# Patient Record
Sex: MALE | Race: WHITE | NOT HISPANIC OR LATINO | Employment: FULL TIME | URBAN - METROPOLITAN AREA
[De-identification: names, ages, dates, MRNs, and addresses within clinical notes are randomized per-mention and may not be internally consistent; named-entity substitution may affect disease eponyms.]

---

## 2022-07-06 ENCOUNTER — TELEPHONE (OUTPATIENT)
Dept: OTHER | Facility: OTHER | Age: 57
End: 2022-07-06

## 2022-07-06 NOTE — TELEPHONE ENCOUNTER
Pt called in stating he missed his appt today with the office at 330 due to his job and would like a call back to reschedule

## 2022-11-10 ENCOUNTER — OFFICE VISIT (OUTPATIENT)
Dept: GASTROENTEROLOGY | Facility: CLINIC | Age: 57
End: 2022-11-10

## 2022-11-10 VITALS
HEART RATE: 74 BPM | HEIGHT: 70 IN | SYSTOLIC BLOOD PRESSURE: 138 MMHG | DIASTOLIC BLOOD PRESSURE: 90 MMHG | BODY MASS INDEX: 38.08 KG/M2 | OXYGEN SATURATION: 96 % | WEIGHT: 266 LBS

## 2022-11-10 DIAGNOSIS — Z12.11 ENCOUNTER FOR SCREENING COLONOSCOPY: ICD-10-CM

## 2022-11-10 RX ORDER — ATORVASTATIN CALCIUM 10 MG/1
10 TABLET, FILM COATED ORAL DAILY
COMMUNITY
Start: 2022-10-28

## 2022-11-10 RX ORDER — EMPAGLIFLOZIN 25 MG/1
25 TABLET, FILM COATED ORAL DAILY
COMMUNITY
Start: 2022-10-11

## 2022-11-10 RX ORDER — GLIMEPIRIDE 4 MG/1
4 TABLET ORAL 2 TIMES DAILY
COMMUNITY
Start: 2022-10-17

## 2022-11-10 RX ORDER — ATENOLOL 25 MG/1
25 TABLET ORAL DAILY
COMMUNITY
Start: 2022-09-21

## 2022-11-10 RX ORDER — VALSARTAN AND HYDROCHLOROTHIAZIDE 160; 12.5 MG/1; MG/1
1 TABLET, FILM COATED ORAL DAILY
COMMUNITY

## 2022-11-10 NOTE — PROGRESS NOTES
[facility] - Outpatient Consultation  Светлана Thompson 49 y o  male MRN: [id number]  Encounter: [id number]          ASSESSMENT AND PLAN:      1  Encounter for screening colonoscopy  This will be his first screening colonoscopy  Average risk      ______________________________________________________________________    HPI:  81-year-old male with a history of diabetes presents for evaluation prior to scheduling a screening colonoscopy  This will be his 1st colonoscopy  He has no gastrointestinal symptoms at present such as abdominal pain, diarrhea, constipation or rectal bleeding  He denies the family history of colorectal cancer  REVIEW OF SYSTEMS:    CONSTITUTIONAL: Denies any fever, chills, rigors, and weight loss  HEENT: No earache or tinnitus  Denies hearing loss or visual disturbances  CARDIOVASCULAR: No chest pain or palpitations  RESPIRATORY: Denies any cough, hemoptysis, shortness of breath or dyspnea on exertion  GASTROINTESTINAL: As noted in the History of Present Illness  GENITOURINARY: No problems with urination  Denies any hematuria or dysuria  NEUROLOGIC: No dizziness or vertigo, denies headaches  MUSCULOSKELETAL: Denies any muscle or joint pain  SKIN: Denies skin rashes or itching  ENDOCRINE: Denies excessive thirst  Denies intolerance to heat or cold  PSYCHOSOCIAL: Denies depression or anxiety  Denies any recent memory loss  Historical Information   No past medical history on file  No past surgical history on file  Social History   Social History     Substance and Sexual Activity   Alcohol Use Not on file     Social History     Substance and Sexual Activity   Drug Use Not on file     Social History     Tobacco Use   Smoking Status Not on file   Smokeless Tobacco Not on file     No family history on file      Meds/Allergies       Current Outpatient Medications:   •  sitaGLIPtin-metFORMIN (JANUMET)  MG per tablet    Not on File        Objective     There were no vitals taken for this visit  There is no height or weight on file to calculate BMI  PHYSICAL EXAM:      General Appearance:   Alert, cooperative, no distress   HEENT:   Normocephalic, atraumatic, anicteric      Neck:  Supple, symmetrical, trachea midline   Lungs:   Clear to auscultation bilaterally; no rales, rhonchi or wheezing; respirations unlabored    Heart[de-identified]   Regular rate and rhythm; no murmur, rub, or gallop  Abdomen:   Soft, non-tender, non-distended; normal bowel sounds; no masses, no organomegaly    Genitalia:   Deferred    Rectal:   Deferred    Extremities:  No cyanosis, clubbing or edema    Pulses:  2+ and symmetric    Skin:  No jaundice, rashes, or lesions    Lymph nodes:  No palpable cervical lymphadenopathy        Lab Results:   No visits with results within 1 Day(s) from this visit  Latest known visit with results is:   No results found for any previous visit  Radiology Results:   No results found

## 2022-11-10 NOTE — PATIENT INSTRUCTIONS
Scheduled date of colonoscopy (as of today):2/10/23  Physician performing colonoscopy:Won  Location of colonoscopy:[place]  Bowel prep reviewed with patient:Ketty/Miralax  Instructions reviewed with patient by:Lawrence bangura  Clearances:  none

## 2023-02-10 ENCOUNTER — HOSPITAL ENCOUNTER (OUTPATIENT)
Dept: GASTROENTEROLOGY | Facility: HOSPITAL | Age: 58
Setting detail: OUTPATIENT SURGERY
Discharge: HOME | End: 2023-02-10

## 2023-02-10 ENCOUNTER — ANESTHESIA EVENT (OUTPATIENT)
Dept: GASTROENTEROLOGY | Facility: HOSPITAL | Age: 58
End: 2023-02-10

## 2023-02-10 ENCOUNTER — ANESTHESIA (OUTPATIENT)
Dept: GASTROENTEROLOGY | Facility: HOSPITAL | Age: 58
End: 2023-02-10

## 2023-02-10 VITALS
BODY MASS INDEX: 37.24 KG/M2 | TEMPERATURE: 98.6 F | WEIGHT: 260.14 LBS | DIASTOLIC BLOOD PRESSURE: 84 MMHG | RESPIRATION RATE: 16 BRPM | HEIGHT: 70 IN | HEART RATE: 66 BPM | SYSTOLIC BLOOD PRESSURE: 131 MMHG | OXYGEN SATURATION: 97 %

## 2023-02-10 DIAGNOSIS — Z12.11 ENCOUNTER FOR SCREENING COLONOSCOPY: ICD-10-CM

## 2023-02-10 PROBLEM — E11.9 DIABETES MELLITUS, TYPE 2 (HCC): Status: ACTIVE | Noted: 2023-02-10

## 2023-02-10 PROBLEM — I10 HTN (HYPERTENSION): Status: ACTIVE | Noted: 2023-02-10

## 2023-02-10 LAB — GLUCOSE SERPL-MCNC: 176 MG/DL (ref 65–140)

## 2023-02-10 RX ORDER — ONDANSETRON 2 MG/ML
4 INJECTION INTRAMUSCULAR; INTRAVENOUS ONCE AS NEEDED
Status: CANCELLED | OUTPATIENT
Start: 2023-02-10

## 2023-02-10 RX ORDER — SODIUM CHLORIDE, SODIUM LACTATE, POTASSIUM CHLORIDE, CALCIUM CHLORIDE 600; 310; 30; 20 MG/100ML; MG/100ML; MG/100ML; MG/100ML
INJECTION, SOLUTION INTRAVENOUS CONTINUOUS PRN
Status: DISCONTINUED | OUTPATIENT
Start: 2023-02-10 | End: 2023-02-10

## 2023-02-10 RX ORDER — PROPOFOL 10 MG/ML
INJECTION, EMULSION INTRAVENOUS AS NEEDED
Status: DISCONTINUED | OUTPATIENT
Start: 2023-02-10 | End: 2023-02-10

## 2023-02-10 RX ADMIN — PROPOFOL 20 MG: 10 INJECTION, EMULSION INTRAVENOUS at 07:54

## 2023-02-10 RX ADMIN — PROPOFOL 20 MG: 10 INJECTION, EMULSION INTRAVENOUS at 08:01

## 2023-02-10 RX ADMIN — PROPOFOL 20 MG: 10 INJECTION, EMULSION INTRAVENOUS at 08:03

## 2023-02-10 RX ADMIN — PROPOFOL 100 MG: 10 INJECTION, EMULSION INTRAVENOUS at 07:48

## 2023-02-10 RX ADMIN — PROPOFOL 20 MG: 10 INJECTION, EMULSION INTRAVENOUS at 08:05

## 2023-02-10 RX ADMIN — PROPOFOL 20 MG: 10 INJECTION, EMULSION INTRAVENOUS at 08:06

## 2023-02-10 RX ADMIN — PROPOFOL 20 MG: 10 INJECTION, EMULSION INTRAVENOUS at 07:56

## 2023-02-10 RX ADMIN — SODIUM CHLORIDE, SODIUM LACTATE, POTASSIUM CHLORIDE, AND CALCIUM CHLORIDE: .6; .31; .03; .02 INJECTION, SOLUTION INTRAVENOUS at 07:29

## 2023-02-10 RX ADMIN — PROPOFOL 20 MG: 10 INJECTION, EMULSION INTRAVENOUS at 07:58

## 2023-02-10 RX ADMIN — PROPOFOL 20 MG: 10 INJECTION, EMULSION INTRAVENOUS at 07:59

## 2023-02-10 RX ADMIN — PROPOFOL 20 MG: 10 INJECTION, EMULSION INTRAVENOUS at 07:50

## 2023-02-10 RX ADMIN — PROPOFOL 20 MG: 10 INJECTION, EMULSION INTRAVENOUS at 07:52

## 2023-02-10 NOTE — H&P
History and Physical -  [facility]  Alejandra Thompson 56 y o  male MRN: [id number]      HPI: Alejandra Thompson is a 56y o  year old male who presents for screening colonoscopy      REVIEW OF SYSTEMS: Per the HPI, and otherwise unremarkable  Historical Information   Past Medical History:   Diagnosis Date   • Diabetes mellitus ([address] )    • Hyperlipidemia    • Hypertension      Past Surgical History:   Procedure Laterality Date   • KNEE ARTHROSCOPY       Social History   Social History     Substance and Sexual Activity   Alcohol Use Yes     Social History     Substance and Sexual Activity   Drug Use Never     Social History     Tobacco Use   Smoking Status Every Day   • Packs/day: 0 50   • Types: Cigarettes   Smokeless Tobacco Never     Family History   Problem Relation Age of Onset   • No Known Problems Mother    • No Known Problems Father        Meds/Allergies     (Not in a hospital admission)      No Known Allergies    Objective     Blood pressure 140/85, pulse 78, temperature 97 5 °F (36 4 °C), temperature source Temporal, resp  rate 19, height 5' 10" (1 778 m), weight 118 kg (260 lb 2 3 oz), SpO2 97 %  PHYSICAL EXAM    Gen: NAD  CV: RRR  CHEST: Clear  ABD: soft, NT/ND  EXT: no edema      ASSESSMENT/PLAN:  This is a 56y o  year old male here for colonoscopy, and he is stable and optimized for his procedure

## 2023-02-10 NOTE — ANESTHESIA PREPROCEDURE EVALUATION
Procedure:  COLONOSCOPY    Relevant Problems   ANESTHESIA (within normal limits)      CARDIO   (+) HTN (hypertension)      ENDO   (+) Diabetes mellitus, type 2 (HCC)      PULMONARY (within normal limits)        Physical Exam    Airway    Mallampati score: III  TM Distance: >3 FB  Neck ROM: full     Dental   No notable dental hx     Cardiovascular  Cardiovascular exam normal    Pulmonary  Pulmonary exam normal     Other Findings        Anesthesia Plan  ASA Score- 3     Anesthesia Type- IV sedation with anesthesia with ASA Monitors  Additional Monitors:   Airway Plan:           Plan Factors-Exercise tolerance (METS): >4 METS  Chart reviewed  EKG reviewed  Existing labs reviewed  Patient summary reviewed  Patient is not a current smoker  Induction-     Postoperative Plan-     Informed Consent- Anesthetic plan and risks discussed with patient  I personally reviewed this patient with the CRNA  Discussed and agreed on the Anesthesia Plan with the CRNA  Rachna Huang

## 2023-02-10 NOTE — ANESTHESIA POSTPROCEDURE EVALUATION
Post-Op Assessment Note    CV Status:  Stable    Pain management: adequate     Mental Status:  Alert and awake   Hydration Status:  Euvolemic   PONV Controlled:  Controlled   Airway Patency:  Patent      Post Op Vitals Reviewed: Yes      Staff: CRNA         No notable events documented      BP   136/78   Temp   98 7   Pulse  76   Resp   18   SpO2   99

## 2023-02-15 ENCOUNTER — TELEPHONE (OUTPATIENT)
Dept: GASTROENTEROLOGY | Facility: CLINIC | Age: 58
End: 2023-02-15

## 2023-02-15 NOTE — TELEPHONE ENCOUNTER
----- Message from Nakia Her DO sent at 2/15/2023  7:01 AM EST -----  Please call the patient with the polyp result  For the polyps were precancerous adenomas and 8 of the polyps were benign polyp    This patient due for repeat colonoscopy in 1 year

## 2023-09-10 ENCOUNTER — HOSPITAL ENCOUNTER (INPATIENT)
Facility: HOSPITAL | Age: 58
LOS: 1 days | Discharge: HOME | DRG: 439 | End: 2023-09-13
Attending: EMERGENCY MEDICINE | Admitting: INTERNAL MEDICINE
Payer: COMMERCIAL

## 2023-09-10 ENCOUNTER — APPOINTMENT (EMERGENCY)
Dept: RADIOLOGY | Facility: HOSPITAL | Age: 58
DRG: 439 | End: 2023-09-10
Payer: COMMERCIAL

## 2023-09-10 ENCOUNTER — APPOINTMENT (EMERGENCY)
Dept: CT IMAGING | Facility: HOSPITAL | Age: 58
DRG: 439 | End: 2023-09-10
Payer: COMMERCIAL

## 2023-09-10 DIAGNOSIS — R10.9 ABDOMINAL PAIN: ICD-10-CM

## 2023-09-10 DIAGNOSIS — R51.9 HEADACHE: ICD-10-CM

## 2023-09-10 DIAGNOSIS — K85.90 PANCREATITIS: Primary | ICD-10-CM

## 2023-09-10 DIAGNOSIS — R73.9 HYPERGLYCEMIA: ICD-10-CM

## 2023-09-10 DIAGNOSIS — I10 PRIMARY HYPERTENSION: ICD-10-CM

## 2023-09-10 LAB
2HR DELTA HS TROPONIN: 1 NG/L
4HR DELTA HS TROPONIN: 1 NG/L
ALBUMIN SERPL BCP-MCNC: 4.1 G/DL (ref 3.5–5)
ALP SERPL-CCNC: 97 U/L (ref 34–104)
ALT SERPL W P-5'-P-CCNC: 64 U/L (ref 7–52)
ANION GAP SERPL CALCULATED.3IONS-SCNC: 11 MMOL/L
AST SERPL W P-5'-P-CCNC: 29 U/L (ref 13–39)
ATRIAL RATE: 81 BPM
ATRIAL RATE: 90 BPM
ATRIAL RATE: 97 BPM
BACTERIA UR QL AUTO: NORMAL /HPF
BASOPHILS # BLD AUTO: 0.07 THOUSANDS/ÂΜL (ref 0–0.1)
BASOPHILS NFR BLD AUTO: 1 % (ref 0–1)
BILIRUB SERPL-MCNC: 0.82 MG/DL (ref 0.2–1)
BILIRUB UR QL STRIP: NEGATIVE
BUN SERPL-MCNC: 10 MG/DL (ref 5–25)
CALCIUM SERPL-MCNC: 9.3 MG/DL (ref 8.4–10.2)
CARDIAC TROPONIN I PNL SERPL HS: 12 NG/L
CARDIAC TROPONIN I PNL SERPL HS: 13 NG/L
CARDIAC TROPONIN I PNL SERPL HS: 13 NG/L
CHLORIDE SERPL-SCNC: 97 MMOL/L (ref 96–108)
CLARITY UR: CLEAR
CO2 SERPL-SCNC: 22 MMOL/L (ref 21–32)
COLOR UR: ABNORMAL
CREAT SERPL-MCNC: 0.83 MG/DL (ref 0.6–1.3)
EOSINOPHIL # BLD AUTO: 0.13 THOUSAND/ÂΜL (ref 0–0.61)
EOSINOPHIL NFR BLD AUTO: 1 % (ref 0–6)
ERYTHROCYTE [DISTWIDTH] IN BLOOD BY AUTOMATED COUNT: 11.6 % (ref 11.6–15.1)
GFR SERPL CREATININE-BSD FRML MDRD: 99 ML/MIN/1.73SQ M
GLUCOSE SERPL-MCNC: 249 MG/DL (ref 65–140)
GLUCOSE SERPL-MCNC: 287 MG/DL (ref 65–140)
GLUCOSE SERPL-MCNC: 328 MG/DL (ref 65–140)
GLUCOSE UR STRIP-MCNC: ABNORMAL MG/DL
HCT VFR BLD AUTO: 50.4 % (ref 36.5–49.3)
HGB BLD-MCNC: 16.9 G/DL (ref 12–17)
HGB UR QL STRIP.AUTO: ABNORMAL
IMM GRANULOCYTES # BLD AUTO: 0.04 THOUSAND/UL (ref 0–0.2)
IMM GRANULOCYTES NFR BLD AUTO: 0 % (ref 0–2)
KETONES UR STRIP-MCNC: ABNORMAL MG/DL
LEUKOCYTE ESTERASE UR QL STRIP: NEGATIVE
LIPASE SERPL-CCNC: 46 U/L (ref 11–82)
LYMPHOCYTES # BLD AUTO: 2.18 THOUSANDS/ÂΜL (ref 0.6–4.47)
LYMPHOCYTES NFR BLD AUTO: 15 % (ref 14–44)
MAGNESIUM SERPL-MCNC: 1.9 MG/DL (ref 1.9–2.7)
MCH RBC QN AUTO: 30.2 PG (ref 26.8–34.3)
MCHC RBC AUTO-ENTMCNC: 33.5 G/DL (ref 31.4–37.4)
MCV RBC AUTO: 90 FL (ref 82–98)
MONOCYTES # BLD AUTO: 0.95 THOUSAND/ÂΜL (ref 0.17–1.22)
MONOCYTES NFR BLD AUTO: 6 % (ref 4–12)
NEUTROPHILS # BLD AUTO: 11.63 THOUSANDS/ÂΜL (ref 1.85–7.62)
NEUTS SEG NFR BLD AUTO: 77 % (ref 43–75)
NITRITE UR QL STRIP: NEGATIVE
NON-SQ EPI CELLS URNS QL MICRO: NORMAL /HPF
NRBC BLD AUTO-RTO: 0 /100 WBCS
P AXIS: 35 DEGREES
P AXIS: 60 DEGREES
P AXIS: 64 DEGREES
PH UR STRIP.AUTO: 5.5 [PH]
PLATELET # BLD AUTO: 212 THOUSANDS/UL (ref 149–390)
PMV BLD AUTO: 12.1 FL (ref 8.9–12.7)
POTASSIUM SERPL-SCNC: 3.9 MMOL/L (ref 3.5–5.3)
PR INTERVAL: 170 MS
PR INTERVAL: 176 MS
PR INTERVAL: 176 MS
PROT SERPL-MCNC: 7.5 G/DL (ref 6.4–8.4)
PROT UR STRIP-MCNC: ABNORMAL MG/DL
QRS AXIS: -37 DEGREES
QRS AXIS: -38 DEGREES
QRS AXIS: -48 DEGREES
QRSD INTERVAL: 116 MS
QRSD INTERVAL: 116 MS
QRSD INTERVAL: 120 MS
QT INTERVAL: 402 MS
QT INTERVAL: 406 MS
QT INTERVAL: 408 MS
QTC INTERVAL: 466 MS
QTC INTERVAL: 496 MS
QTC INTERVAL: 518 MS
RBC # BLD AUTO: 5.59 MILLION/UL (ref 3.88–5.62)
RBC #/AREA URNS AUTO: NORMAL /HPF
SODIUM SERPL-SCNC: 130 MMOL/L (ref 135–147)
SP GR UR STRIP.AUTO: 1.02
T WAVE AXIS: 41 DEGREES
T WAVE AXIS: 44 DEGREES
T WAVE AXIS: 57 DEGREES
UROBILINOGEN UR QL STRIP.AUTO: 0.2 E.U./DL
VENTRICULAR RATE: 81 BPM
VENTRICULAR RATE: 90 BPM
VENTRICULAR RATE: 97 BPM
WBC # BLD AUTO: 15 THOUSAND/UL (ref 4.31–10.16)
WBC #/AREA URNS AUTO: NORMAL /HPF

## 2023-09-10 PROCEDURE — 71045 X-RAY EXAM CHEST 1 VIEW: CPT

## 2023-09-10 PROCEDURE — 85025 COMPLETE CBC W/AUTO DIFF WBC: CPT | Performed by: EMERGENCY MEDICINE

## 2023-09-10 PROCEDURE — 99285 EMERGENCY DEPT VISIT HI MDM: CPT

## 2023-09-10 PROCEDURE — 36415 COLL VENOUS BLD VENIPUNCTURE: CPT

## 2023-09-10 PROCEDURE — 83735 ASSAY OF MAGNESIUM: CPT

## 2023-09-10 PROCEDURE — 96361 HYDRATE IV INFUSION ADD-ON: CPT

## 2023-09-10 PROCEDURE — 96375 TX/PRO/DX INJ NEW DRUG ADDON: CPT

## 2023-09-10 PROCEDURE — 93005 ELECTROCARDIOGRAM TRACING: CPT

## 2023-09-10 PROCEDURE — 74177 CT ABD & PELVIS W/CONTRAST: CPT

## 2023-09-10 PROCEDURE — 84484 ASSAY OF TROPONIN QUANT: CPT | Performed by: INTERNAL MEDICINE

## 2023-09-10 PROCEDURE — 93010 ELECTROCARDIOGRAM REPORT: CPT | Performed by: INTERNAL MEDICINE

## 2023-09-10 PROCEDURE — 70450 CT HEAD/BRAIN W/O DYE: CPT

## 2023-09-10 PROCEDURE — 81001 URINALYSIS AUTO W/SCOPE: CPT | Performed by: EMERGENCY MEDICINE

## 2023-09-10 PROCEDURE — 82948 REAGENT STRIP/BLOOD GLUCOSE: CPT

## 2023-09-10 PROCEDURE — 83690 ASSAY OF LIPASE: CPT | Performed by: EMERGENCY MEDICINE

## 2023-09-10 PROCEDURE — 96374 THER/PROPH/DIAG INJ IV PUSH: CPT

## 2023-09-10 PROCEDURE — 84484 ASSAY OF TROPONIN QUANT: CPT

## 2023-09-10 PROCEDURE — 80053 COMPREHEN METABOLIC PANEL: CPT | Performed by: EMERGENCY MEDICINE

## 2023-09-10 PROCEDURE — 99223 1ST HOSP IP/OBS HIGH 75: CPT | Performed by: INTERNAL MEDICINE

## 2023-09-10 PROCEDURE — 83036 HEMOGLOBIN GLYCOSYLATED A1C: CPT | Performed by: INTERNAL MEDICINE

## 2023-09-10 RX ORDER — OXYCODONE HYDROCHLORIDE 10 MG/1
10 TABLET ORAL EVERY 4 HOURS PRN
Status: DISCONTINUED | OUTPATIENT
Start: 2023-09-10 | End: 2023-09-11

## 2023-09-10 RX ORDER — KETOROLAC TROMETHAMINE 30 MG/ML
15 INJECTION, SOLUTION INTRAMUSCULAR; INTRAVENOUS ONCE
Status: COMPLETED | OUTPATIENT
Start: 2023-09-10 | End: 2023-09-10

## 2023-09-10 RX ORDER — HYDROCHLOROTHIAZIDE 12.5 MG/1
12.5 TABLET ORAL DAILY
Status: DISCONTINUED | OUTPATIENT
Start: 2023-09-11 | End: 2023-09-13 | Stop reason: HOSPADM

## 2023-09-10 RX ORDER — MORPHINE SULFATE 4 MG/ML
4 INJECTION, SOLUTION INTRAMUSCULAR; INTRAVENOUS ONCE
Status: COMPLETED | OUTPATIENT
Start: 2023-09-10 | End: 2023-09-10

## 2023-09-10 RX ORDER — INSULIN LISPRO 100 [IU]/ML
2-12 INJECTION, SOLUTION INTRAVENOUS; SUBCUTANEOUS
Status: DISCONTINUED | OUTPATIENT
Start: 2023-09-10 | End: 2023-09-13 | Stop reason: HOSPADM

## 2023-09-10 RX ORDER — ONDANSETRON 2 MG/ML
4 INJECTION INTRAMUSCULAR; INTRAVENOUS EVERY 6 HOURS PRN
Status: DISCONTINUED | OUTPATIENT
Start: 2023-09-10 | End: 2023-09-13 | Stop reason: HOSPADM

## 2023-09-10 RX ORDER — OXYCODONE HYDROCHLORIDE 5 MG/1
5 TABLET ORAL EVERY 4 HOURS PRN
Status: DISCONTINUED | OUTPATIENT
Start: 2023-09-10 | End: 2023-09-11

## 2023-09-10 RX ORDER — ACETAMINOPHEN 325 MG/1
650 TABLET ORAL EVERY 4 HOURS PRN
Status: DISCONTINUED | OUTPATIENT
Start: 2023-09-10 | End: 2023-09-13 | Stop reason: HOSPADM

## 2023-09-10 RX ORDER — LOSARTAN POTASSIUM 50 MG/1
100 TABLET ORAL DAILY
Status: DISCONTINUED | OUTPATIENT
Start: 2023-09-11 | End: 2023-09-10

## 2023-09-10 RX ORDER — ACETAMINOPHEN 325 MG/1
650 TABLET ORAL ONCE
Status: COMPLETED | OUTPATIENT
Start: 2023-09-10 | End: 2023-09-10

## 2023-09-10 RX ORDER — ENOXAPARIN SODIUM 100 MG/ML
40 INJECTION SUBCUTANEOUS
Status: DISCONTINUED | OUTPATIENT
Start: 2023-09-11 | End: 2023-09-13 | Stop reason: HOSPADM

## 2023-09-10 RX ORDER — SODIUM CHLORIDE 9 MG/ML
150 INJECTION, SOLUTION INTRAVENOUS CONTINUOUS
Status: DISCONTINUED | OUTPATIENT
Start: 2023-09-10 | End: 2023-09-13

## 2023-09-10 RX ORDER — ATORVASTATIN CALCIUM 10 MG/1
10 TABLET, FILM COATED ORAL DAILY
Status: DISCONTINUED | OUTPATIENT
Start: 2023-09-11 | End: 2023-09-13 | Stop reason: HOSPADM

## 2023-09-10 RX ORDER — NICOTINE 21 MG/24HR
1 PATCH, TRANSDERMAL 24 HOURS TRANSDERMAL DAILY
Status: DISCONTINUED | OUTPATIENT
Start: 2023-09-11 | End: 2023-09-13 | Stop reason: HOSPADM

## 2023-09-10 RX ORDER — HYDROMORPHONE HCL/PF 1 MG/ML
0.5 SYRINGE (ML) INJECTION EVERY 6 HOURS PRN
Status: DISCONTINUED | OUTPATIENT
Start: 2023-09-10 | End: 2023-09-13 | Stop reason: HOSPADM

## 2023-09-10 RX ORDER — LOSARTAN POTASSIUM 50 MG/1
100 TABLET ORAL DAILY
Status: DISCONTINUED | OUTPATIENT
Start: 2023-09-10 | End: 2023-09-13 | Stop reason: HOSPADM

## 2023-09-10 RX ORDER — ATENOLOL 25 MG/1
25 TABLET ORAL DAILY
Status: DISCONTINUED | OUTPATIENT
Start: 2023-09-11 | End: 2023-09-12

## 2023-09-10 RX ADMIN — LOSARTAN POTASSIUM 100 MG: 50 TABLET, FILM COATED ORAL at 22:46

## 2023-09-10 RX ADMIN — IOHEXOL 100 ML: 350 INJECTION, SOLUTION INTRAVENOUS at 14:58

## 2023-09-10 RX ADMIN — INSULIN LISPRO 4 UNITS: 100 INJECTION, SOLUTION INTRAVENOUS; SUBCUTANEOUS at 18:58

## 2023-09-10 RX ADMIN — ACETAMINOPHEN 650 MG: 325 TABLET ORAL at 15:09

## 2023-09-10 RX ADMIN — KETOROLAC TROMETHAMINE 15 MG: 30 INJECTION, SOLUTION INTRAMUSCULAR; INTRAVENOUS at 15:58

## 2023-09-10 RX ADMIN — INSULIN LISPRO 6 UNITS: 100 INJECTION, SOLUTION INTRAVENOUS; SUBCUTANEOUS at 21:26

## 2023-09-10 RX ADMIN — SODIUM CHLORIDE 125 ML/HR: 0.9 INJECTION, SOLUTION INTRAVENOUS at 18:57

## 2023-09-10 RX ADMIN — OXYCODONE HYDROCHLORIDE 10 MG: 10 TABLET ORAL at 21:29

## 2023-09-10 RX ADMIN — MORPHINE SULFATE 4 MG: 4 INJECTION, SOLUTION INTRAMUSCULAR; INTRAVENOUS at 16:50

## 2023-09-10 RX ADMIN — SODIUM CHLORIDE 1000 ML: 0.9 INJECTION, SOLUTION INTRAVENOUS at 14:32

## 2023-09-10 NOTE — ASSESSMENT & PLAN NOTE
· BP continues to be elevated- likely secondary to pain  · Continue home atenolol, HCTZ, losartan  · Monitor blood pressures

## 2023-09-10 NOTE — ED PROVIDER NOTES
History  Chief Complaint   Patient presents with   • Flank Pain     Left side radiating to left back. Denies N/V/D/ fevers. Started two days ago  Lightheaded    • Urinary Frequency     Patient has been urinating more frequent. Patient reports he is a diabetic. Increased thirst and has not checked sugar    • Headache     Left sided started today. Complained last week HA to back of the head     Patient is a 61-year-old male with past medical history of hypertension, diabetes arriving for evaluation of left-sided, upper abdominal pain that started on Friday night into Saturday. Patient notes that he did eat a cheese steak, and pieces of pizza just prior to the pain starting. Patient states that this pain comes and goes for the past few months but today is more intense and has not been relieved. Patient states its "more annoying."  Patient denies any nausea, vomiting, diarrhea, fevers. Patient reports that pain radiates from the left upper quadrant to his left flank. Patient denies any urinary symptoms, denies any pain into his testicles. Patient reports that he "always urinates because, diabetic."  Patient denies any burning with urination. Patient noted that he is also having a headache on left side of his head, but reports that this headache is no more intense than previous headaches as he has a history of them. Patient states that this happens and he takes ibuprofen and he feels improved. Patient has no focal neurodeficits. Patient denies any numbness or tingling into distal extremities. Patient denies any syncope, near syncope, head trauma. Patient denies any blurred or double vision. Patient states that his left eye has always "been weaker," than his right eye and this is not a new finding and has been this way for years. Also reporting intermittent left shoulder discomfort when he turns his arm a certain way he has more discomfort that starts at his left shoulder and will radiate down to his arm. Patient denies any numbness or tingling, weakness. Patient states that this has been ongoing and he has attributed to the fact that his "rotator cuff is shot."  Patient has no decrease in range of motion, no decrease in sensation. Prior to Admission Medications   Prescriptions Last Dose Informant Patient Reported? Taking? Jardiance 25 MG TABS  Self Yes Yes   Sig: Take 25 mg by mouth daily   atenolol (TENORMIN) 25 mg tablet  Self Yes Yes   Sig: Take 25 mg by mouth daily   atorvastatin (LIPITOR) 10 mg tablet  Self Yes Yes   Sig: Take 10 mg by mouth daily   glimepiride (AMARYL) 4 mg tablet  Self Yes Yes   Sig: Take 4 mg by mouth 2 (two) times a day   sitaGLIPtin-metFORMIN (JANUMET)  MG per tablet  Self Yes Yes   Sig: Take 1 tablet by mouth 2 (two) times a day with meals   valsartan-hydrochlorothiazide (DIOVAN-HCT) 160-12.5 MG per tablet  Self Yes Yes   Sig: Take 1 tablet by mouth daily      Facility-Administered Medications: None       Past Medical History:   Diagnosis Date   • Diabetes mellitus ([address])    • Hyperlipidemia    • Hypertension        Past Surgical History:   Procedure Laterality Date   • KNEE ARTHROSCOPY         Family History   Problem Relation Age of Onset   • No Known Problems Mother    • No Known Problems Father      I have reviewed and agree with the history as documented. E-Cigarette/Vaping   • E-Cigarette Use Never User      E-Cigarette/Vaping Substances   • Nicotine No    • THC No    • CBD No    • Flavoring No    • Other No    • Unknown No      Social History     Tobacco Use   • Smoking status: Every Day     Packs/day: 0.50     Types: Cigarettes   • Smokeless tobacco: Never   Vaping Use   • Vaping Use: Never used   Substance Use Topics   • Alcohol use: Yes   • Drug use: Never       Review of Systems   Constitutional: Negative. HENT: Negative. Eyes: Negative. Respiratory: Negative. Cardiovascular: Negative. Gastrointestinal: Positive for abdominal pain. Endocrine: Negative. Genitourinary: Negative. Musculoskeletal: Negative. Skin: Negative. Allergic/Immunologic: Negative. Neurological: Negative. Hematological: Negative. Psychiatric/Behavioral: Negative. All other systems reviewed and are negative. Physical Exam  Physical Exam  Vitals and nursing note reviewed. Constitutional:       General: He is not in acute distress. Appearance: Normal appearance. He is normal weight. He is not ill-appearing, toxic-appearing or diaphoretic. HENT:      Head: Normocephalic. Right Ear: External ear normal.      Left Ear: External ear normal.      Nose: Nose normal.      Mouth/Throat:      Mouth: Mucous membranes are moist.      Pharynx: Oropharynx is clear. No oropharyngeal exudate or posterior oropharyngeal erythema. Eyes:      Extraocular Movements: Extraocular movements intact. Conjunctiva/sclera: Conjunctivae normal.      Pupils: Pupils are equal, round, and reactive to light. Cardiovascular:      Rate and Rhythm: Normal rate and regular rhythm. Pulses: Normal pulses. Heart sounds: Normal heart sounds. Pulmonary:      Effort: Pulmonary effort is normal.      Breath sounds: Normal breath sounds. Abdominal:      General: Abdomen is flat. There is no distension. Palpations: Abdomen is soft. There is no mass. Tenderness: There is abdominal tenderness. There is no right CVA tenderness, left CVA tenderness, guarding or rebound. Hernia: No hernia is present. Musculoskeletal:      Cervical back: Normal range of motion and neck supple. Skin:     General: Skin is warm. Capillary Refill: Capillary refill takes less than 2 seconds. Neurological:      General: No focal deficit present. Mental Status: He is alert and oriented to person, place, and time. Mental status is at baseline. GCS: GCS eye subscore is 4. GCS verbal subscore is 5. GCS motor subscore is 6.       Cranial Nerves: Cranial nerves 2-12 are intact. Sensory: Sensation is intact. Motor: Motor function is intact. No weakness, abnormal muscle tone or pronator drift. Coordination: Coordination is intact. Finger-Nose-Finger Test and Heel to Monson Test normal.      Gait: Gait is intact. Gait normal.      Comments: 5/5 upper extremity strength, no numbness or tingling   5/5 lower extremity strength, no numbness or tingling   Psychiatric:         Mood and Affect: Mood normal.         Thought Content:  Thought content normal.         Vital Signs  ED Triage Vitals   Temperature Pulse Respirations Blood Pressure SpO2   09/10/23 1334 09/10/23 1334 09/10/23 1334 09/10/23 1334 09/10/23 1334   97.6 °F (36.4 °C) 97 18 (!) 161/110 93 %      Temp Source Heart Rate Source Patient Position - Orthostatic VS BP Location FiO2 (%)   09/10/23 1334 09/10/23 1334 09/10/23 1334 09/10/23 1334 --   Temporal Monitor Sitting Right arm       Pain Score       09/10/23 1509       7           Vitals:    09/10/23 1625 09/10/23 1628 09/10/23 1630 09/10/23 1820   BP: (!) 181/108 (!) 174/90 139/67 (!) 176/105   Pulse: 80  80 92   Patient Position - Orthostatic VS:    Sitting         Visual Acuity  Visual Acuity    Flowsheet Row Most Recent Value   L Pupil Size (mm) 3   R Pupil Size (mm) 3          ED Medications  Medications   atenolol (TENORMIN) tablet 25 mg (has no administration in time range)   atorvastatin (LIPITOR) tablet 10 mg (has no administration in time range)   losartan (COZAAR) tablet 100 mg (has no administration in time range)   hydrochlorothiazide (HYDRODIURIL) tablet 12.5 mg (has no administration in time range)   acetaminophen (TYLENOL) tablet 650 mg (has no administration in time range)   ondansetron (ZOFRAN) injection 4 mg (has no administration in time range)   nicotine (NICODERM CQ) 21 mg/24 hr TD 24 hr patch 1 patch (has no administration in time range)   enoxaparin (LOVENOX) subcutaneous injection 40 mg (has no administration in time range) oxyCODONE (ROXICODONE) IR tablet 5 mg (has no administration in time range)   oxyCODONE (ROXICODONE) immediate release tablet 10 mg (has no administration in time range)   HYDROmorphone (DILAUDID) injection 0.5 mg (has no administration in time range)   insulin lispro (HumaLOG) 100 units/mL subcutaneous injection 2-12 Units (has no administration in time range)   insulin lispro (HumaLOG) 100 units/mL subcutaneous injection 2-12 Units (has no administration in time range)   sodium chloride 0.9 % infusion (has no administration in time range)   sodium chloride 0.9 % bolus 1,000 mL (0 mL Intravenous Stopped 9/10/23 1532)   acetaminophen (TYLENOL) tablet 650 mg (650 mg Oral Given 9/10/23 1509)   iohexol (OMNIPAQUE) 350 MG/ML injection (MULTI-DOSE) 100 mL (100 mL Intravenous Given 9/10/23 1458)   ketorolac (TORADOL) injection 15 mg (15 mg Intravenous Given 9/10/23 1558)   morphine injection 4 mg (4 mg Intravenous Given 9/10/23 1650)       Diagnostic Studies  Results Reviewed     Procedure Component Value Units Date/Time    HS Troponin I 4hr [[id number]] Collected: 09/10/23 1840    Lab Status: No result Specimen: Blood from Arm, Right     Hemoglobin A1C w/ EAG Estimation [[id number]] Collected: 09/10/23 1341    Lab Status:  In process Specimen: Blood from Arm, Left Updated: 09/10/23 1804    Fingerstick Glucose (POCT) [[id number]]  (Abnormal) Collected: 09/10/23 1730    Lab Status: Final result Updated: 09/10/23 1732     POC Glucose 249 mg/dl     HS Troponin I 2hr [[id number]]  (Normal) Collected: 09/10/23 1625    Lab Status: Final result Specimen: Blood from Line, Venous Updated: 09/10/23 1656     hs TnI 2hr 13 ng/L      Delta 2hr hsTnI 1 ng/L     HS Troponin 0hr (reflex protocol) [[id number]]  (Normal) Collected: 09/10/23 1431    Lab Status: Final result Specimen: Blood from Line, Venous Updated: 09/10/23 1509     hs TnI 0hr 12 ng/L     Magnesium [[id number]]  (Normal) Collected: 09/10/23 1341    Lab Status: Final result Specimen: Blood from Arm, Left Updated: 09/10/23 1445     Magnesium 1.9 mg/dL     Urine Microscopic [[id number]]  (Normal) Collected: 09/10/23 1404    Lab Status: Final result Specimen: Urine, Clean Catch Updated: 09/10/23 1432     RBC, UA 0-1 /hpf      WBC, UA 0-1 /hpf      Epithelial Cells Occasional /hpf      Bacteria, UA None Seen /hpf     UA w Reflex to Microscopic w Reflex to Culture [[id number]]  (Abnormal) Collected: 09/10/23 1404    Lab Status: Final result Specimen: Urine, Clean Catch Updated: 09/10/23 1428     Color, UA Straw     Clarity, UA Clear     Specific Gravity, UA 1.020     pH, UA 5.5     Leukocytes, UA Negative     Nitrite, UA Negative     Protein, UA Trace mg/dl      Glucose, UA 3+ mg/dl      Ketones, UA 80 (3+) mg/dl      Urobilinogen, UA 0.2 E.U./dl      Bilirubin, UA Negative     Occult Blood, UA Trace-Intact    Comprehensive metabolic panel [[id number]]  (Abnormal) Collected: 09/10/23 1341    Lab Status: Final result Specimen: Blood from Arm, Left Updated: 09/10/23 1401     Sodium 130 mmol/L      Potassium 3.9 mmol/L      Chloride 97 mmol/L      CO2 22 mmol/L      ANION GAP 11 mmol/L      BUN 10 mg/dL      Creatinine 0.83 mg/dL      Glucose 328 mg/dL      Calcium 9.3 mg/dL      AST 29 U/L      ALT 64 U/L      Alkaline Phosphatase 97 U/L      Total Protein 7.5 g/dL      Albumin 4.1 g/dL      Total Bilirubin 0.82 mg/dL      eGFR 99 ml/min/1.73sq m     Narrative:      [place] guidelines for Chronic Kidney Disease (CKD):   •  Stage 1 with normal or high GFR (GFR > 90 mL/min/1.73 square meters)  •  Stage 2 Mild CKD (GFR = 60-89 mL/min/1.73 square meters)  •  Stage 3A Moderate CKD (GFR = 45-59 mL/min/1.73 square meters)  •  Stage 3B Moderate CKD (GFR = 30-44 mL/min/1.73 square meters)  •  Stage 4 Severe CKD (GFR = 15-29 mL/min/1.73 square meters)  •  Stage 5 End Stage CKD (GFR <15 mL/min/1.73 square meters)  Note: GFR calculation is accurate only with a steady state creatinine    Lipase [[id number]]  (Normal) Collected: 09/10/23 1341    Lab Status: Final result Specimen: Blood from Arm, Left Updated: 09/10/23 1401     Lipase 46 u/L     CBC and differential [[id number]]  (Abnormal) Collected: 09/10/23 1341    Lab Status: Final result Specimen: Blood from Arm, Left Updated: 09/10/23 1350     WBC 15.00 Thousand/uL      RBC 5.59 Million/uL      Hemoglobin 16.9 g/dL      Hematocrit 50.4 %      MCV 90 fL      MCH 30.2 pg      MCHC 33.5 g/dL      RDW 11.6 %      MPV 12.1 fL      Platelets 820 Thousands/uL      nRBC 0 /100 WBCs      Neutrophils Relative 77 %      Immat GRANS % 0 %      Lymphocytes Relative 15 %      Monocytes Relative 6 %      Eosinophils Relative 1 %      Basophils Relative 1 %      Neutrophils Absolute 11.63 Thousands/µL      Immature Grans Absolute 0.04 Thousand/uL      Lymphocytes Absolute 2.18 Thousands/µL      Monocytes Absolute 0.95 Thousand/µL      Eosinophils Absolute 0.13 Thousand/µL      Basophils Absolute 0.07 Thousands/µL                  CT head without contrast   Final Result by Micky Caro MD (09/10 1517)      No acute intracranial abnormality. Workstation performed: [id number]         CT abdomen pelvis with contrast   Final Result by Jaciel Phoenix MD (09/10 1539)      No evidence of bowel obstruction. Mild peripancreatic fat stranding of the pancreatic tail suggestive of mild acute pancreatitis interstitial edematous pancreatitis. Hepatic steatosis and mild hepatosplenomegaly. Circumferential mural thickening of the distal esophagus, which may relate to esophagitis. The study was marked in [facility] for immediate notification. Workstation performed: [id number]         X-ray chest 1 view portable   ED Interpretation by RADHA Stone (09/10 1550)   No acute cardiopulmonary disease. Final Result by Diana Mae MD (09/10 1706)      No acute cardiopulmonary disease.                Workstation performed: [id number]          right upper quadrant    (Results Pending)              Procedures  ECG 12 Lead Documentation Only    Date/Time: 9/10/2023 4:29 PM    Performed by: RADHA Stone  Authorized by: RADHA Stone    Indications / Diagnosis:  Left arm pain, uppper left ab  ECG reviewed by me, the ED Provider: yes    Patient location:  ED  Interpretation:     Interpretation: normal    Rate:     ECG rate:  81    ECG rate assessment: normal    Rhythm:     Rhythm: sinus rhythm    Ectopy:     Ectopy: none    QRS:     QRS axis:  Left  Conduction:     Conduction: normal    ST segments:     ST segments:  Normal  T waves:     T waves: normal      ECG 12 Lead Documentation Only    Date/Time: 9/10/2023 2:30 PM    Performed by: RADHA Stone  Authorized by: RADHA Stone    Indications / Diagnosis:  Left arm  ECG reviewed by me, the ED Provider: yes    Patient location:  ED  Interpretation:     Interpretation: normal    Rate:     ECG rate:  90    ECG rate assessment: normal    Rhythm:     Rhythm: sinus rhythm    Ectopy:     Ectopy: none    QRS:     QRS axis:  Normal  Conduction:     Conduction: normal    ST segments:     ST segments:  Normal  T waves:     T waves: normal               ED Course  ED Course as of 09/10/23 1844   Sun Sep 10, 2023   1608 Reviewed findings of mild pancreatitis with the patient, who declines narcotic pain medication as he reports he is a  and cannot have this medication as it could show up on a drug test if he is randomly tested. 1631 Anion Gap: 11   1635 Pt continues to have significant discomfort. Accepting of morphine.               HEART Risk Score    Flowsheet Row Most Recent Value   Heart Score Risk Calculator    History 0 Filed at: 09/10/2023 1839   ECG 0 Filed at: 09/10/2023 1839   Age 1 Filed at: 09/10/2023 1839   Risk Factors 1 Filed at: 09/10/2023 1839   Troponin 1 Filed at: 09/10/2023 1839   HEART Score 3 Filed at: 09/10/2023 1839                        SBIRT 20yo+    Flowsheet Row Most Recent Value   Initial Alcohol Screen: US AUDIT-C     1. How often do you have a drink containing alcohol? 3 Filed at: 09/10/2023 6397   2. How many drinks containing alcohol do you have on a typical day you are drinking? 1 Filed at: 09/10/2023 1336   3a. Male UNDER 65: How often do you have five or more drinks on one occasion? 0 Filed at: 09/10/2023 1331   3b. FEMALE Any Age, or MALE 65+: How often do you have 4 or more drinks on one occassion? 0 Filed at: 09/10/2023 1335   Audit-C Score 4 Filed at: 09/10/2023 1335   ANTHONY: How many times in the past year have you. .. Used an illegal drug or used a prescription medication for non-medical reasons? Never Filed at: 09/10/2023 5971                    Medical Decision Making  Differential diagnosis is including small bowel obstruction, pancreatitis, electrolyte abnormality   Patient reports that he has been having left upper quadrant pain that radiates around to his left flank. Patient states its been ongoing since Friday and started. Patient reports at that time he had a cheese steak, and pizza. Patient denies any nausea, vomiting, diarrhea. Patient reports that this pain has been coming and going for the past few months but is more intense recently. Patient denies any surgeries. Patient states that he is a diabetic. Patient denies any burning with urination. Will order abdominal pain work-up, and CT abdomen pelvis. Patient reporting that he has intermittent headaches that are mild, and today's headache is no different than prior headaches. Patient reports that he is never had this evaluated. Patient noted to be hyperglycemic, likely cause of his headache. Patient has no focal neurodeficits. However patient has never had any imaging of his head, will check CT head. Patient denies any thunderclap, sudden onset of headache. Patient denies any blurred or double vision.   Patient noted to have a leukocytosis of 15, no other sirs criteria met. No anemia, does have hyperglycemia in the presence of acute diabetes. Patient has no anion gap, no signs of acidosis. Patient noted to be slightly hyponatremic but based on correction patient's sodium is actually 134. Patient's urine demonstrating dehydration. Patient provided with fluids. No signs of UTI on urine micro. Patient has initial troponin of 12, and second opponent of 13 with no acute changes to EKG. Patient has a heart score of 3, and per [facility] ACS algorithm, ACS ruled out. CT findings appreciated. Patient's pain is not controlled with Toradol, Tylenol, fluids. Patient is hesitant to accept narcotic pain medicine as he is a . Patient aware this is an acute illness, and that there is medical documentation of his necessity. Patient provided with dose of morphine without any improvement. Due to intractable pain, and finding of pancreatitis, will admit patient. Did discuss potential with patient for outpatient management as his lipase is within normal limits, but patient is reporting he is too uncomfortable for discharge discussed case with Dr. Sixto Michel. Amount and/or Complexity of Data Reviewed  Labs: ordered. Decision-making details documented in ED Course. Radiology: ordered and independent interpretation performed. Risk  OTC drugs. Prescription drug management. Decision regarding hospitalization.           Disposition  Final diagnoses:   Pancreatitis   Headache   Abdominal pain   Hyperglycemia     Time reflects when diagnosis was documented in both MDM as applicable and the Disposition within this note     Time User Action Codes Description Comment    9/10/2023  5:05 PM [place] Garza Add [K85.90] Pancreatitis     9/10/2023  5:05 PM [place] Garza Add [R51.9] Headache     9/10/2023  5:05 PM [place] Garza Add [R10.9] Abdominal pain     9/10/2023  5:05 PM Indiana Garza Add [R73.9] Hyperglycemia ED Disposition     ED Disposition   Admit    Condition   Stable    Date/Time   Sun Sep 10, 2023  5:05 PM    Comment   Case was discussed with Dr. Maynor Nguyen and the patient's admission status was agreed to be Admission Status: observation status to the service of Dr. Maynor Nguyen . Follow-up Information    None         Current Discharge Medication List      CONTINUE these medications which have NOT CHANGED    Details   atenolol (TENORMIN) 25 mg tablet Take 25 mg by mouth daily      atorvastatin (LIPITOR) 10 mg tablet Take 10 mg by mouth daily      glimepiride (AMARYL) 4 mg tablet Take 4 mg by mouth 2 (two) times a day      Jardiance 25 MG TABS Take 25 mg by mouth daily      sitaGLIPtin-metFORMIN (JANUMET)  MG per tablet Take 1 tablet by mouth 2 (two) times a day with meals      valsartan-hydrochlorothiazide (DIOVAN-HCT) 160-12.5 MG per tablet Take 1 tablet by mouth daily             No discharge procedures on file.     PDMP Review     None          ED Provider  Electronically Signed by           RADHA Luevano  09/10/23 9194

## 2023-09-10 NOTE — ASSESSMENT & PLAN NOTE
· Slightly hypertensive on presentation likely secondary to pain  · Continue home atenolol, HCTZ, losartan  · Monitor blood pressures

## 2023-09-10 NOTE — H&P
[address]  H&P  Name: Tamir Alvarado 56 y.o. male I MRN: [id number]  Unit/Bed#: ED 24 I Date of Admission: 9/10/2023   Date of Service: 9/10/2023 I Hospital Day: 0      Assessment/Plan   * Pancreatitis  Assessment & Plan  · Lipase within normal limits however evidence of peripancreatic fat stranding on CT abdomen  · No evidence of pancreatic necrosis  · Patient with significant pain resistant to IV opioid medications in the ED  · IV fluid hydration  · Pain regimen as ordered  · Clear liquid diet for now ; advance as tolerated    Diabetes mellitus, type 2 (HCC)  Assessment & Plan  · Patient is hyperglycemic on presentation  · Hold home oral hypoglycemic agents  · Sliding scale insulin with Accu-Cheks while inpatient  · Diabetic diet  · Check hemoglobin A1c      HTN (hypertension)  Assessment & Plan  · Slightly hypertensive on presentation likely secondary to pain  · Continue home atenolol, HCTZ, losartan  · Monitor blood pressures       VTE Prophylaxis: Lovenox  Code Status: Level 1 full code    Anticipated Length of Stay:  Patient will be admitted on an Observation basis with an anticipated length of stay of less than 2 midnights. Justification for Hospital Stay: Pancreatitis    Total Time for Visit, including Counseling / Coordination of Care: I have spent a total time of 45 minutes on 09/10/23 in caring for this patient including Diagnostic results, Prognosis, Risks and benefits of tx options, Instructions for management, Patient and family education, Importance of tx compliance, Risk factor reductions, Impressions, Counseling / Coordination of care, Documenting in the medical record, Reviewing / ordering tests, medicine, procedures  , Obtaining or reviewing history   and Communicating with other healthcare professionals .     Chief Complaint:   Abdominal pain    History of Present Illness:    Tamir Alvarado is a 56 y.o. male with a past medical history significant for hypertension, type 2 diabetes mellitus who presents with complaints of abdominal pain. The patient states the pain is located on the left side of his abdomen radiating to the back. Started about 2 days ago. CT abdomen with evidence of peripancreatic fat stranding consistent with pancreatitis. Lipase within normal limits. The patient was assigned to observation in the setting of pancreatitis and persistent abdominal pain. Review of Systems:    Review of Systems   Constitutional: Negative for chills and fever. HENT: Negative for ear pain and sore throat. Eyes: Negative for pain and visual disturbance. Respiratory: Negative for cough and shortness of breath. Cardiovascular: Negative for chest pain and palpitations. Gastrointestinal: Positive for abdominal pain. Negative for vomiting. Genitourinary: Positive for flank pain. Negative for dysuria and hematuria. Musculoskeletal: Negative for arthralgias and back pain. Skin: Negative for color change and rash. Neurological: Negative for seizures and syncope. All other systems reviewed and are negative. Past Medical and Surgical History:     Past Medical History:   Diagnosis Date   • Diabetes mellitus ([address])    • Hyperlipidemia    • Hypertension        Past Surgical History:   Procedure Laterality Date   • KNEE ARTHROSCOPY         Meds/Allergies:    Prior to Admission medications    Medication Sig Start Date End Date Taking?  Authorizing Provider   atenolol (TENORMIN) 25 mg tablet Take 25 mg by mouth daily 9/21/22  Yes Historical Provider, MD   atorvastatin (LIPITOR) 10 mg tablet Take 10 mg by mouth daily 10/28/22  Yes Historical Provider, MD   glimepiride (AMARYL) 4 mg tablet Take 4 mg by mouth 2 (two) times a day 10/17/22  Yes Historical Provider, MD   Jardiance 25 MG TABS Take 25 mg by mouth daily 10/11/22  Yes Historical Provider, MD   sitaGLIPtin-metFORMIN (JANUMET)  MG per tablet Take 1 tablet by mouth 2 (two) times a day with meals   Yes Historical Provider, MD   valsartan-hydrochlorothiazide (DIOVAN-HCT) 160-12.5 MG per tablet Take 1 tablet by mouth daily   Yes Historical Provider, MD       Allergies: No Known Allergies    Social History:     Marital Status: /Civil Union   Substance Use History:   Social History     Substance and Sexual Activity   Alcohol Use Yes     Social History     Tobacco Use   Smoking Status Every Day   • Packs/day: 0.50   • Types: Cigarettes   Smokeless Tobacco Never     Social History     Substance and Sexual Activity   Drug Use Never       Family History:    Pertinent family history reviewed    Physical Exam:     Vitals:   Blood Pressure: 139/67 (09/10/23 1630)  Pulse: 80 (09/10/23 1630)  Temperature: 97.6 °F (36.4 °C) (09/10/23 1334)  Temp Source: Temporal (09/10/23 1334)  Respirations: 18 (09/10/23 1630)  SpO2: 95 % (09/10/23 1630)    Physical Exam  Vitals and nursing note reviewed. Constitutional:       General: He is not in acute distress. Appearance: He is well-developed. HENT:      Head: Normocephalic and atraumatic. Eyes:      Conjunctiva/sclera: Conjunctivae normal.   Cardiovascular:      Rate and Rhythm: Normal rate and regular rhythm. Heart sounds: No murmur heard. Pulmonary:      Effort: Pulmonary effort is normal. No respiratory distress. Breath sounds: Normal breath sounds. Abdominal:      Palpations: Abdomen is soft. Tenderness: There is no abdominal tenderness. Musculoskeletal:         General: No swelling. Cervical back: Neck supple. Skin:     General: Skin is warm and dry. Capillary Refill: Capillary refill takes less than 2 seconds. Neurological:      Mental Status: He is alert.    Psychiatric:         Mood and Affect: Mood normal.          Additional Data:     Lab Results: I have reviewed pertinent results     Results from last 7 days   Lab Units 09/10/23  1341   WBC Thousand/uL 15.00*   HEMOGLOBIN g/dL 16.9   HEMATOCRIT % 50.4*   PLATELETS Thousands/uL 212 NEUTROS PCT % 77*   LYMPHS PCT % 15   MONOS PCT % 6   EOS PCT % 1     Results from last 7 days   Lab Units 09/10/23  1341   SODIUM mmol/L 130*   POTASSIUM mmol/L 3.9   CHLORIDE mmol/L 97   CO2 mmol/L 22   BUN mg/dL 10   CREATININE mg/dL 0.83   ANION GAP mmol/L 11   CALCIUM mg/dL 9.3   ALBUMIN g/dL 4.1   TOTAL BILIRUBIN mg/dL 0.82   ALK PHOS U/L 97   ALT U/L 64*   AST U/L 29   GLUCOSE RANDOM mg/dL 328*         Results from last 7 days   Lab Units 09/10/23  1730   POC GLUCOSE mg/dl 249*               Imaging: I have reviewed pertinent imaging     CT head without contrast   Final Result by Kalee Pak MD (09/10 1517)      No acute intracranial abnormality. Workstation performed: [id number]         CT abdomen pelvis with contrast   Final Result by Day Yancey MD (09/10 1539)      No evidence of bowel obstruction. Mild peripancreatic fat stranding of the pancreatic tail suggestive of mild acute pancreatitis interstitial edematous pancreatitis. Hepatic steatosis and mild hepatosplenomegaly. Circumferential mural thickening of the distal esophagus, which may relate to esophagitis. The study was marked in [facility] for immediate notification. Workstation performed: [id number]         X-ray chest 1 view portable   ED Interpretation by RADHA Tucker (09/10 1550)   No acute cardiopulmonary disease. Final Result by Barbara Burdick MD (09/10 1706)      No acute cardiopulmonary disease. Workstation performed: [id number]             EKG, Pathology, and Other Studies Reviewed on Admission:   · EKG: NSR    Allscripts / Epic Records Reviewed    ** Please Note: This note has been constructed using a voice recognition system.  **

## 2023-09-10 NOTE — ASSESSMENT & PLAN NOTE
· Patient is hyperglycemic on presentation  · Hold home oral hypoglycemic agents  · Sliding scale insulin with Accu-Cheks while inpatient  · Diabetic diet  · Check hemoglobin A1c

## 2023-09-10 NOTE — ASSESSMENT & PLAN NOTE
· Lipase within normal limits however evidence of peripancreatic fat stranding on CT abdomen  · Possibly alcohol related- patient reports drinking 3 polish beers which is a higher alcohol content  · CLAUDIA US- negative for choledocholithiasis  · Triglycerides 273  · Hepatic function panel- WNL  · No evidence of pancreatic necrosis  · Patient with significant pain resistant to IV opioid medications in the ED  · Continue IV fluid hydration  · Pain regimen as ordered  · Clear liquid diet for now- patient continues to have 9/10 pain; advance as tolerated

## 2023-09-10 NOTE — H&P
[id number] [facility]  H&P  Name: Debbie Brown 56 y.o. male I MRN: [id number]  Unit/Bed#: -01 I Date of Admission: 9/10/2023   Date of Service: 9/10/2023 I Hospital Day: 0      Assessment/Plan   * Pancreatitis  Assessment & Plan  · Lipase within normal limits however evidence of peripancreatic fat stranding on CT abdomen  · Unclear etiology  · No recent alcohol abuse  · Obtain right upper quadrant abdominal ultrasound to rule out choledocholithiasis  · Check LFTs  · No evidence of pancreatic necrosis  · Patient with significant pain resistant to IV opioid medications in the ED  · IV fluid hydration  · Pain regimen as ordered  · Clear liquid diet for now ; advance as tolerated    Assessment & Plan  · Lipase within normal limits however evidence of peripancreatic fat stranding on CT abdomen  · No evidence of pancreatic necrosis  · Patient with significant pain resistant to IV opioid medications in the ED  · IV fluid hydration  · Pain regimen as ordered  · Clear liquid diet for now ; advance as tolerated    Diabetes mellitus, type 2 (HCC)  Assessment & Plan  · Patient is hyperglycemic on presentation  · Hold home oral hypoglycemic agents  · Sliding scale insulin with Accu-Cheks while inpatient  · Diabetic diet  · Check hemoglobin A1c      Assessment & Plan  · Patient is hyperglycemic on presentation  · Hold home oral hypoglycemic agents  · Sliding scale insulin with Accu-Cheks while inpatient  · Diabetic diet  · Check hemoglobin A1c      HTN (hypertension)  Assessment & Plan  · Slightly hypertensive on presentation likely secondary to pain  · Continue home atenolol, HCTZ, losartan  · Monitor blood pressures    Assessment & Plan  · Slightly hypertensive on presentation likely secondary to pain  · Continue home atenolol, HCTZ, losartan  · Monitor blood pressures       VTE Prophylaxis: Lovenox  Code Status: Level 1 full code    Anticipated Length of Stay:  Patient will be admitted on an Observation basis with an anticipated length of stay of less than 2 midnights. Justification for Hospital Stay: Pancreatitis    Total Time for Visit, including Counseling / Coordination of Care: I have spent a total time of 45 minutes on 09/10/23 in caring for this patient including Diagnostic results, Prognosis, Risks and benefits of tx options, Instructions for management, Patient and family education, Importance of tx compliance, Risk factor reductions, Impressions, Counseling / Coordination of care, Documenting in the medical record, Reviewing / ordering tests, medicine, procedures  , Obtaining or reviewing history   and Communicating with other healthcare professionals . Chief Complaint:   Abdominal pain    History of Present Illness:    Felix Tsang is a 56 y.o. male with a past medical history significant for hypertension, type 2 diabetes mellitus who presents with complaints of abdominal pain. The patient states the pain is located on the left side of his abdomen radiating to the back. Started about 2 days ago. CT abdomen with evidence of peripancreatic fat stranding consistent with pancreatitis. Lipase within normal limits. The patient was assigned to observation in the setting of pancreatitis and persistent abdominal pain. Review of Systems:    Review of Systems   Constitutional: Negative for chills and fever. HENT: Negative for ear pain and sore throat. Eyes: Negative for pain and visual disturbance. Respiratory: Negative for cough and shortness of breath. Cardiovascular: Negative for chest pain and palpitations. Gastrointestinal: Positive for abdominal pain. Negative for vomiting. Genitourinary: Positive for flank pain. Negative for dysuria and hematuria. Musculoskeletal: Negative for arthralgias and back pain. Skin: Negative for color change and rash. Neurological: Negative for seizures and syncope. All other systems reviewed and are negative.       Past Medical and Surgical History:     Past Medical History:   Diagnosis Date   • Diabetes mellitus ([address])    • Hyperlipidemia    • Hypertension        Past Surgical History:   Procedure Laterality Date   • KNEE ARTHROSCOPY         Meds/Allergies:    Prior to Admission medications    Medication Sig Start Date End Date Taking? Authorizing Provider   atenolol (TENORMIN) 25 mg tablet Take 25 mg by mouth daily 9/21/22  Yes Historical Provider, MD   atorvastatin (LIPITOR) 10 mg tablet Take 10 mg by mouth daily 10/28/22  Yes Historical Provider, MD   glimepiride (AMARYL) 4 mg tablet Take 4 mg by mouth 2 (two) times a day 10/17/22  Yes Historical Provider, MD   Jardiance 25 MG TABS Take 25 mg by mouth daily 10/11/22  Yes Historical Provider, MD   sitaGLIPtin-metFORMIN (JANUMET)  MG per tablet Take 1 tablet by mouth 2 (two) times a day with meals   Yes Historical Provider, MD   valsartan-hydrochlorothiazide (DIOVAN-HCT) 160-12.5 MG per tablet Take 1 tablet by mouth daily   Yes Historical Provider, MD       Allergies: No Known Allergies    Social History:     Marital Status: /Civil Union   Substance Use History:   Social History     Substance and Sexual Activity   Alcohol Use Yes     Social History     Tobacco Use   Smoking Status Every Day   • Packs/day: 0.50   • Types: Cigarettes   Smokeless Tobacco Never     Social History     Substance and Sexual Activity   Drug Use Never       Family History:    Pertinent family history reviewed    Physical Exam:     Vitals:   Blood Pressure: (!) 176/105 (09/10/23 1820)  Pulse: 92 (09/10/23 1820)  Temperature: 98.9 °F (37.2 °C) (09/10/23 1820)  Temp Source: Oral (09/10/23 1820)  Respirations: 18 (09/10/23 1820)  Height: 5' 9" (175.3 cm) (09/10/23 1817)  Weight - Scale: 116 kg (255 lb 8.2 oz) (09/10/23 1817)  SpO2: 96 % (09/10/23 1820)    Physical Exam  Vitals and nursing note reviewed. Constitutional:       General: He is not in acute distress. Appearance: He is well-developed.    HENT: Head: Normocephalic and atraumatic. Eyes:      Conjunctiva/sclera: Conjunctivae normal.   Cardiovascular:      Rate and Rhythm: Normal rate and regular rhythm. Heart sounds: No murmur heard. Pulmonary:      Effort: Pulmonary effort is normal. No respiratory distress. Breath sounds: Normal breath sounds. Abdominal:      Palpations: Abdomen is soft. Tenderness: There is no abdominal tenderness. Musculoskeletal:         General: No swelling. Cervical back: Neck supple. Skin:     General: Skin is warm and dry. Capillary Refill: Capillary refill takes less than 2 seconds. Neurological:      Mental Status: He is alert. Psychiatric:         Mood and Affect: Mood normal.          Additional Data:     Lab Results: I have reviewed pertinent results     Results from last 7 days   Lab Units 09/10/23  1341   WBC Thousand/uL 15.00*   HEMOGLOBIN g/dL 16.9   HEMATOCRIT % 50.4*   PLATELETS Thousands/uL 212   NEUTROS PCT % 77*   LYMPHS PCT % 15   MONOS PCT % 6   EOS PCT % 1     Results from last 7 days   Lab Units 09/10/23  1341   SODIUM mmol/L 130*   POTASSIUM mmol/L 3.9   CHLORIDE mmol/L 97   CO2 mmol/L 22   BUN mg/dL 10   CREATININE mg/dL 0.83   ANION GAP mmol/L 11   CALCIUM mg/dL 9.3   ALBUMIN g/dL 4.1   TOTAL BILIRUBIN mg/dL 0.82   ALK PHOS U/L 97   ALT U/L 64*   AST U/L 29   GLUCOSE RANDOM mg/dL 328*         Results from last 7 days   Lab Units 09/10/23  1730   POC GLUCOSE mg/dl 249*               Imaging: I have reviewed pertinent imaging     CT head without contrast   Final Result by Kaushal Caruso MD (09/10 5067)      No acute intracranial abnormality. Workstation performed: [id number]         CT abdomen pelvis with contrast   Final Result by Roby Newman MD (09/10 4569)      No evidence of bowel obstruction. Mild peripancreatic fat stranding of the pancreatic tail suggestive of mild acute pancreatitis interstitial edematous pancreatitis.       Hepatic steatosis and mild hepatosplenomegaly. Circumferential mural thickening of the distal esophagus, which may relate to esophagitis. The study was marked in [facility] for immediate notification. Workstation performed: [id number]         X-ray chest 1 view portable   ED Interpretation by RADHA Santa (09/10 1550)   No acute cardiopulmonary disease. Final Result by Nathan Payne MD (09/10 1706)      No acute cardiopulmonary disease. Workstation performed: [id number]         US right upper quadrant    (Results Pending)       EKG, Pathology, and Other Studies Reviewed on Admission:   · EKG: NSR    Allscripts / Epic Records Reviewed    ** Please Note: This note has been constructed using a voice recognition system.  **

## 2023-09-10 NOTE — PLAN OF CARE
Problem: PAIN - ADULT  Goal: Verbalizes/displays adequate comfort level or baseline comfort level  Description: Interventions:  - Encourage patient to monitor pain and request assistance  - Assess pain using appropriate pain scale  - Administer analgesics based on type and severity of pain and evaluate response  - Implement non-pharmacological measures as appropriate and evaluate response  - Consider cultural and social influences on pain and pain management  - Notify physician/advanced practitioner if interventions unsuccessful or patient reports new pain  Outcome: Progressing     Problem: INFECTION - ADULT  Goal: Absence or prevention of progression during hospitalization  Description: INTERVENTIONS:  - Assess and monitor for signs and symptoms of infection  - Monitor lab/diagnostic results  - Monitor all insertion sites, i.e. indwelling lines, tubes, and drains  - Monitor endotracheal if appropriate and nasal secretions for changes in amount and color  - [place] appropriate cooling/warming therapies per order  - Administer medications as ordered  - Instruct and encourage patient and family to use good hand hygiene technique  - Identify and instruct in appropriate isolation precautions for identified infection/condition  Outcome: Progressing  Goal: Absence of fever/infection during neutropenic period  Description: INTERVENTIONS:  - Monitor WBC    Outcome: Progressing     Problem: SAFETY ADULT  Goal: Patient will remain free of falls  Description: INTERVENTIONS:  - Educate patient/family on patient safety including physical limitations  - Instruct patient to call for assistance with activity   - Consult OT/PT to assist with strengthening/mobility   - Keep Call bell within reach  - Keep bed low and locked with side rails adjusted as appropriate  - Keep care items and personal belongings within reach  - Initiate and maintain comfort rounds  - Make Fall Risk Sign visible to staff  - Offer Toileting every 1 Hours, in advance of need  - Initiate/Maintain bed alarm  - Obtain necessary fall risk management equipment: bed   - Apply yellow socks and bracelet for high fall risk patients  - Consider moving patient to room near nurses station  Outcome: Progressing  Goal: Maintain or return to baseline ADL function  Description: INTERVENTIONS:  -  Assess patient's ability to carry out ADLs; assess patient's baseline for ADL function and identify physical deficits which impact ability to perform ADLs (bathing, care of mouth/teeth, toileting, grooming, dressing, etc.)  - Assess/evaluate cause of self-care deficits   - Assess range of motion  - Assess patient's mobility; develop plan if impaired  - Assess patient's need for assistive devices and provide as appropriate  - Encourage maximum independence but intervene and supervise when necessary  - Involve family in performance of ADLs  - Assess for home care needs following discharge   - Consider OT consult to assist with ADL evaluation and planning for discharge  - Provide patient education as appropriate  Outcome: Progressing  Goal: Maintains/Returns to pre admission functional level  Description: INTERVENTIONS:  - Perform BMAT or MOVE assessment daily.   - Set and communicate daily mobility goal to care team and patient/family/caregiver. - Collaborate with rehabilitation services on mobility goals if consulted  - Perform Range of Motion 3 times a day. - Reposition patient every 2 hours.   - Dangle patient 3 times a day  - Stand patient 3 times a day  - Ambulate patient 3 times a day  - Out of bed to chair 3 times a day   - Out of bed for meals 3 times a day  - Out of bed for toileting  - Record patient progress and toleration of activity level   Outcome: Progressing     Problem: DISCHARGE PLANNING  Goal: Discharge to home or other facility with appropriate resources  Description: INTERVENTIONS:  - Identify barriers to discharge w/patient and caregiver  - Arrange for needed discharge resources and transportation as appropriate  - Identify discharge learning needs (meds, wound care, etc.)  - Arrange for interpretive services to assist at discharge as needed  - Refer to Case Management Department for coordinating discharge planning if the patient needs post-hospital services based on physician/advanced practitioner order or complex needs related to functional status, cognitive ability, or social support system  Outcome: Progressing     Problem: Knowledge Deficit  Goal: Patient/family/caregiver demonstrates understanding of disease process, treatment plan, medications, and discharge instructions  Description: Complete learning assessment and assess knowledge base.   Interventions:  - Provide teaching at level of understanding  - Provide teaching via preferred learning methods  Outcome: Progressing     Problem: GASTROINTESTINAL - ADULT  Goal: Minimal or absence of nausea and/or vomiting  Description: INTERVENTIONS:  - Administer IV fluids if ordered to ensure adequate hydration  - Maintain NPO status until nausea and vomiting are resolved  - Nasogastric tube if ordered  - Administer ordered antiemetic medications as needed  - Provide nonpharmacologic comfort measures as appropriate  - Advance diet as tolerated, if ordered  - Consider nutrition services referral to assist patient with adequate nutrition and appropriate food choices  Outcome: Progressing  Goal: Maintains or returns to baseline bowel function  Description: INTERVENTIONS:  - Assess bowel function  - Encourage oral fluids to ensure adequate hydration  - Administer IV fluids if ordered to ensure adequate hydration  - Administer ordered medications as needed  - Encourage mobilization and activity  - Consider nutritional services referral to assist patient with adequate nutrition and appropriate food choices  Outcome: Progressing  Goal: Maintains adequate nutritional intake  Description: INTERVENTIONS:  - Monitor percentage of each meal consumed  - Identify factors contributing to decreased intake, treat as appropriate  - Assist with meals as needed  - Monitor I&O, weight, and lab values if indicated  - Obtain nutrition services referral as needed  Outcome: Progressing  Goal: Establish and maintain optimal ostomy function  Description: INTERVENTIONS:  - Assess bowel function  - Encourage oral fluids to ensure adequate hydration  - Administer IV fluids if ordered to ensure adequate hydration   - Administer ordered medications as needed  - Encourage mobilization and activity  - Nutrition services referral to assist patient with appropriate food choices  - Assess stoma site  - Consider wound care consult   Outcome: Progressing  Goal: Oral mucous membranes remain intact  Description: INTERVENTIONS  - Assess oral mucosa and hygiene practices  - Implement preventative oral hygiene regimen  - Implement oral medicated treatments as ordered  - Initiate Nutrition services referral as needed  Outcome: Progressing

## 2023-09-10 NOTE — ASSESSMENT & PLAN NOTE
· Patient is hyperglycemic on presentation  · Hold home oral hypoglycemic agents  · Sliding scale insulin with Accu-Cheks while inpatient  · Diabetic clear liquids  · Will add Lantus 5 U q am   · hemoglobin A1c- 13.6

## 2023-09-10 NOTE — ASSESSMENT & PLAN NOTE
· Lipase within normal limits however evidence of peripancreatic fat stranding on CT abdomen  · No evidence of pancreatic necrosis  · Patient with significant pain resistant to IV opioid medications in the ED  · IV fluid hydration  · Pain regimen as ordered  · Clear liquid diet for now ; advance as tolerated

## 2023-09-11 ENCOUNTER — APPOINTMENT (OUTPATIENT)
Dept: ULTRASOUND IMAGING | Facility: HOSPITAL | Age: 58
DRG: 439 | End: 2023-09-11
Payer: COMMERCIAL

## 2023-09-11 PROBLEM — E78.5 HYPERLIPIDEMIA: Status: ACTIVE | Noted: 2023-09-11

## 2023-09-11 LAB
ALBUMIN SERPL BCP-MCNC: 3.6 G/DL (ref 3.5–5)
ALP SERPL-CCNC: 90 U/L (ref 34–104)
ALT SERPL W P-5'-P-CCNC: 50 U/L (ref 7–52)
ANION GAP SERPL CALCULATED.3IONS-SCNC: 12 MMOL/L
AST SERPL W P-5'-P-CCNC: 26 U/L (ref 13–39)
BASOPHILS # BLD AUTO: 0.05 THOUSANDS/ÂΜL (ref 0–0.1)
BASOPHILS NFR BLD AUTO: 0 % (ref 0–1)
BILIRUB DIRECT SERPL-MCNC: 0.19 MG/DL (ref 0–0.2)
BILIRUB SERPL-MCNC: 0.83 MG/DL (ref 0.2–1)
BUN SERPL-MCNC: 9 MG/DL (ref 5–25)
CALCIUM SERPL-MCNC: 8.4 MG/DL (ref 8.4–10.2)
CHLORIDE SERPL-SCNC: 99 MMOL/L (ref 96–108)
CO2 SERPL-SCNC: 21 MMOL/L (ref 21–32)
CREAT SERPL-MCNC: 0.73 MG/DL (ref 0.6–1.3)
EOSINOPHIL # BLD AUTO: 0.17 THOUSAND/ÂΜL (ref 0–0.61)
EOSINOPHIL NFR BLD AUTO: 1 % (ref 0–6)
ERYTHROCYTE [DISTWIDTH] IN BLOOD BY AUTOMATED COUNT: 11.7 % (ref 11.6–15.1)
EST. AVERAGE GLUCOSE BLD GHB EST-MCNC: 344 MG/DL
GFR SERPL CREATININE-BSD FRML MDRD: 104 ML/MIN/1.73SQ M
GLUCOSE SERPL-MCNC: 214 MG/DL (ref 65–140)
GLUCOSE SERPL-MCNC: 249 MG/DL (ref 65–140)
GLUCOSE SERPL-MCNC: 271 MG/DL (ref 65–140)
GLUCOSE SERPL-MCNC: 283 MG/DL (ref 65–140)
GLUCOSE SERPL-MCNC: 288 MG/DL (ref 65–140)
HBA1C MFR BLD: 13.6 %
HCT VFR BLD AUTO: 48.1 % (ref 36.5–49.3)
HGB BLD-MCNC: 15.7 G/DL (ref 12–17)
IMM GRANULOCYTES # BLD AUTO: 0.04 THOUSAND/UL (ref 0–0.2)
IMM GRANULOCYTES NFR BLD AUTO: 0 % (ref 0–2)
LYMPHOCYTES # BLD AUTO: 1.61 THOUSANDS/ÂΜL (ref 0.6–4.47)
LYMPHOCYTES NFR BLD AUTO: 13 % (ref 14–44)
MCH RBC QN AUTO: 30.1 PG (ref 26.8–34.3)
MCHC RBC AUTO-ENTMCNC: 32.6 G/DL (ref 31.4–37.4)
MCV RBC AUTO: 92 FL (ref 82–98)
MONOCYTES # BLD AUTO: 0.88 THOUSAND/ÂΜL (ref 0.17–1.22)
MONOCYTES NFR BLD AUTO: 7 % (ref 4–12)
NEUTROPHILS # BLD AUTO: 9.52 THOUSANDS/ÂΜL (ref 1.85–7.62)
NEUTS SEG NFR BLD AUTO: 79 % (ref 43–75)
NRBC BLD AUTO-RTO: 0 /100 WBCS
PLATELET # BLD AUTO: 204 THOUSANDS/UL (ref 149–390)
PMV BLD AUTO: 12 FL (ref 8.9–12.7)
POTASSIUM SERPL-SCNC: 4 MMOL/L (ref 3.5–5.3)
PROT SERPL-MCNC: 6.7 G/DL (ref 6.4–8.4)
RBC # BLD AUTO: 5.22 MILLION/UL (ref 3.88–5.62)
SODIUM SERPL-SCNC: 132 MMOL/L (ref 135–147)
TRIGL SERPL-MCNC: 273 MG/DL
WBC # BLD AUTO: 12.27 THOUSAND/UL (ref 4.31–10.16)

## 2023-09-11 PROCEDURE — 80076 HEPATIC FUNCTION PANEL: CPT | Performed by: INTERNAL MEDICINE

## 2023-09-11 PROCEDURE — 99232 SBSQ HOSP IP/OBS MODERATE 35: CPT | Performed by: PHYSICIAN ASSISTANT

## 2023-09-11 PROCEDURE — 80048 BASIC METABOLIC PNL TOTAL CA: CPT | Performed by: INTERNAL MEDICINE

## 2023-09-11 PROCEDURE — 85025 COMPLETE CBC W/AUTO DIFF WBC: CPT | Performed by: INTERNAL MEDICINE

## 2023-09-11 PROCEDURE — 84478 ASSAY OF TRIGLYCERIDES: CPT | Performed by: PHYSICIAN ASSISTANT

## 2023-09-11 PROCEDURE — 76705 ECHO EXAM OF ABDOMEN: CPT

## 2023-09-11 PROCEDURE — 82948 REAGENT STRIP/BLOOD GLUCOSE: CPT

## 2023-09-11 RX ORDER — LABETALOL HYDROCHLORIDE 5 MG/ML
10 INJECTION, SOLUTION INTRAVENOUS EVERY 6 HOURS PRN
Status: DISCONTINUED | OUTPATIENT
Start: 2023-09-11 | End: 2023-09-13 | Stop reason: HOSPADM

## 2023-09-11 RX ORDER — KETOROLAC TROMETHAMINE 30 MG/ML
15 INJECTION, SOLUTION INTRAMUSCULAR; INTRAVENOUS EVERY 6 HOURS PRN
Status: DISPENSED | OUTPATIENT
Start: 2023-09-11 | End: 2023-09-13

## 2023-09-11 RX ORDER — INSULIN GLARGINE 100 [IU]/ML
5 INJECTION, SOLUTION SUBCUTANEOUS EVERY MORNING
Status: DISCONTINUED | OUTPATIENT
Start: 2023-09-11 | End: 2023-09-12

## 2023-09-11 RX ORDER — KETOROLAC TROMETHAMINE 30 MG/ML
15 INJECTION, SOLUTION INTRAMUSCULAR; INTRAVENOUS ONCE
Status: COMPLETED | OUTPATIENT
Start: 2023-09-11 | End: 2023-09-11

## 2023-09-11 RX ADMIN — ATORVASTATIN CALCIUM 10 MG: 10 TABLET, FILM COATED ORAL at 10:00

## 2023-09-11 RX ADMIN — INSULIN LISPRO 4 UNITS: 100 INJECTION, SOLUTION INTRAVENOUS; SUBCUTANEOUS at 16:54

## 2023-09-11 RX ADMIN — ATENOLOL 25 MG: 25 TABLET ORAL at 09:59

## 2023-09-11 RX ADMIN — KETOROLAC TROMETHAMINE 15 MG: 30 INJECTION, SOLUTION INTRAMUSCULAR; INTRAVENOUS at 09:59

## 2023-09-11 RX ADMIN — INSULIN LISPRO 6 UNITS: 100 INJECTION, SOLUTION INTRAVENOUS; SUBCUTANEOUS at 21:41

## 2023-09-11 RX ADMIN — SODIUM CHLORIDE 150 ML/HR: 0.9 INJECTION, SOLUTION INTRAVENOUS at 23:23

## 2023-09-11 RX ADMIN — KETOROLAC TROMETHAMINE 15 MG: 30 INJECTION, SOLUTION INTRAMUSCULAR; INTRAVENOUS at 20:29

## 2023-09-11 RX ADMIN — SODIUM CHLORIDE 125 ML/HR: 0.9 INJECTION, SOLUTION INTRAVENOUS at 02:27

## 2023-09-11 RX ADMIN — HYDROMORPHONE HYDROCHLORIDE 0.5 MG: 1 INJECTION, SOLUTION INTRAMUSCULAR; INTRAVENOUS; SUBCUTANEOUS at 00:41

## 2023-09-11 RX ADMIN — SODIUM CHLORIDE 125 ML/HR: 0.9 INJECTION, SOLUTION INTRAVENOUS at 10:08

## 2023-09-11 RX ADMIN — SODIUM CHLORIDE 150 ML/HR: 0.9 INJECTION, SOLUTION INTRAVENOUS at 16:54

## 2023-09-11 RX ADMIN — Medication 10 MG: at 15:38

## 2023-09-11 RX ADMIN — OXYCODONE HYDROCHLORIDE 10 MG: 10 TABLET ORAL at 02:27

## 2023-09-11 RX ADMIN — HYDROCHLOROTHIAZIDE 12.5 MG: 12.5 TABLET ORAL at 09:59

## 2023-09-11 RX ADMIN — ENOXAPARIN SODIUM 40 MG: 40 INJECTION SUBCUTANEOUS at 09:58

## 2023-09-11 RX ADMIN — INSULIN GLARGINE 5 UNITS: 100 INJECTION, SOLUTION SUBCUTANEOUS at 12:30

## 2023-09-11 RX ADMIN — INSULIN LISPRO 6 UNITS: 100 INJECTION, SOLUTION INTRAVENOUS; SUBCUTANEOUS at 12:31

## 2023-09-11 RX ADMIN — LOSARTAN POTASSIUM 100 MG: 50 TABLET, FILM COATED ORAL at 10:00

## 2023-09-11 RX ADMIN — INSULIN LISPRO 4 UNITS: 100 INJECTION, SOLUTION INTRAVENOUS; SUBCUTANEOUS at 09:56

## 2023-09-11 NOTE — PROGRESS NOTES
[facility]  Progress Note  Name: Kenya Loera  MRN: [id number]  Unit/Bed#: -01 I Date of Admission: 9/10/2023   Date of Service: 9/11/2023 I Hospital Day: 0    Assessment/Plan   * Pancreatitis  Assessment & Plan  · Lipase within normal limits however evidence of peripancreatic fat stranding on CT abdomen  · Possibly alcohol related- patient reports drinking 3 polish beers which is a higher alcohol content  · CLAUDIA US- negative for choledocholithiasis  · Triglycerides 273  · Hepatic function panel- WNL  · No evidence of pancreatic necrosis  · Patient with significant pain resistant to IV opioid medications in the ED  · Continue IV fluid hydration  · Pain regimen as ordered  · Clear liquid diet for now- patient continues to have 9/10 pain; advance as tolerated      Diabetes mellitus, type 2 ([address])  Assessment & Plan  · Patient is hyperglycemic on presentation  · Hold home oral hypoglycemic agents  · Sliding scale insulin with Accu-Cheks while inpatient  · Diabetic clear liquids  · Will add Lantus 5 U q am   · hemoglobin A1c- 13.6      HTN (hypertension)  Assessment & Plan  · BP continues to be elevated- likely secondary to pain  · Continue home atenolol, HCTZ, losartan  · Monitor blood pressures         Hyperlipidemia   · Continue Lipitor       VTE Pharmacologic Prophylaxis: VTE Score: 2 Low Risk (Score 0-2) - Encourage Ambulation. Patient Centered Rounds: I performed bedside rounds with nursing staff today. Discussions with Specialists or Other Care Team Provider: nursing, CM    Education and Discussions with Family / Patient: patient updated.      Total Time Spent on Date of Encounter in care of patient: 35 minutes This time was spent on one or more of the following: performing physical exam; counseling and coordination of care; obtaining or reviewing history; documenting in the medical record; reviewing/ordering tests, medications or procedures; communicating with other healthcare professionals and discussing with patient's family/caregivers. Current Length of Stay: 0 day(s)  Current Patient Status: Observation   Certification Statement: The patient will continue to require additional inpatient hospital stay due to continued treatment of pancreatitis with IV fluids, pain control  Discharge Plan: Anticipate discharge in 24-48 hrs to home. Code Status: Level 1 - Full Code    Subjective: The patient was seen and examined. The patient states he continues to have 9/10 pain which does improve with IV Toradol. Objective:     Vitals:   Temp (24hrs), Av.3 °F (36.8 °C), Min:97.6 °F (36.4 °C), Max:98.9 °F (37.2 °C)    Temp:  [97.6 °F (36.4 °C)-98.9 °F (37.2 °C)] 98.3 °F (36.8 °C)  HR:  [80-97] 82  Resp:  [16-20] 18  BP: (139-181)/() 171/98  SpO2:  [93 %-96 %] 96 %  Body mass index is 37.73 kg/m². Input and Output Summary (last 24 hours): Intake/Output Summary (Last 24 hours) at 2023 1214  Last data filed at 2023 0837  Gross per 24 hour   Intake 2550 ml   Output --   Net 2550 ml       Physical Exam:   Physical Exam  Vitals and nursing note reviewed. Constitutional:       General: He is awake. Appearance: Normal appearance. HENT:      Head: Normocephalic and atraumatic. Cardiovascular:      Rate and Rhythm: Normal rate and regular rhythm. Heart sounds: Normal heart sounds. Pulmonary:      Effort: Pulmonary effort is normal.      Breath sounds: Normal breath sounds. Abdominal:      Palpations: Abdomen is soft. Tenderness: There is abdominal tenderness in the left upper quadrant. There is no guarding or rebound. Skin:     General: Skin is warm and dry. Neurological:      General: No focal deficit present. Mental Status: He is alert and oriented to person, place, and time.    Psychiatric:         Attention and Perception: Attention normal.         Mood and Affect: Mood normal.         Speech: Speech normal.         Behavior: Behavior is cooperative.           Additional Data:     Labs:  Results from last 7 days   Lab Units 09/11/23  0555   WBC Thousand/uL 12.27*   HEMOGLOBIN g/dL 15.7   HEMATOCRIT % 48.1   PLATELETS Thousands/uL 204   NEUTROS PCT % 79*   LYMPHS PCT % 13*   MONOS PCT % 7   EOS PCT % 1     Results from last 7 days   Lab Units 09/11/23  0555   SODIUM mmol/L 132*   POTASSIUM mmol/L 4.0   CHLORIDE mmol/L 99   CO2 mmol/L 21   BUN mg/dL 9   CREATININE mg/dL 0.73   ANION GAP mmol/L 12   CALCIUM mg/dL 8.4   ALBUMIN g/dL 3.6   TOTAL BILIRUBIN mg/dL 0.83   ALK PHOS U/L 90   ALT U/L 50   AST U/L 26   GLUCOSE RANDOM mg/dL 288*         Results from last 7 days   Lab Units 09/11/23  1059 09/11/23  0657 09/10/23  2125 09/10/23  1730   POC GLUCOSE mg/dl 283* 249* 287* 249*     Results from last 7 days   Lab Units 09/10/23  1341   HEMOGLOBIN A1C % 13.6*           Lines/Drains:  Invasive Devices     Peripheral Intravenous Line  Duration           Peripheral IV 09/10/23 Left Antecubital <1 day                      Imaging: Reviewed radiology reports from this admission including: ultrasound(s)    Recent Cultures (last 7 days):         Last 24 Hours Medication List:   Current Facility-Administered Medications   Medication Dose Route Frequency Provider Last Rate   • acetaminophen  650 mg Oral Q4H PRN Falling Waters Hicks, DO     • atenolol  25 mg Oral Daily Falling Waters Hicks, DO     • atorvastatin  10 mg Oral Daily Falling Waters Hicks, DO     • enoxaparin  40 mg Subcutaneous Q24H [facility] Falling Waters Hicks, DO     • hydrochlorothiazide  12.5 mg Oral Daily Falling Waters Hicks, DO     • HYDROmorphone  0.5 mg Intravenous Q6H PRN Falling Waters Hicks, DO     • insulin glargine  5 Units Subcutaneous QAM Amador Dill PA-C     • insulin lispro  2-12 Units Subcutaneous TID AC Falling Waters Hicks, DO     • insulin lispro  2-12 Units Subcutaneous HS Falling Waters Hicks, DO     • ketorolac  15 mg Intravenous Q6H PRN Amador Dill PA-C     • losartan  100 mg Oral Daily Guera Palomo MARY     • nicotine  1 patch Transdermal Daily Michelle Blair DO     • ondansetron  4 mg Intravenous Q6H PRN Michelle Blair DO     • sodium chloride  150 mL/hr Intravenous Continuous Jason Naidu PA-C 150 mL/hr (09/11/23 1130)        Today, Patient Was Seen By: Jason Naidu PA-C    **Please Note: This note may have been constructed using a voice recognition system. **

## 2023-09-11 NOTE — UTILIZATION REVIEW
OBSERVATION 9/10/23 @ 1706 CONVERTED TO INPATIENT 9/12/23 @ 1750 DUE TO PANCREATITIS REQUIRING CONTINUED IV FLUIDS. Initial Clinical Review    Admission: Date/Time/Statement:   Admission Orders (From admission, onward)       Ordered        09/12/23 1750  Inpatient Admission  Once            09/10/23 1706  Place in Observation  Once                          Orders Placed This Encounter   Procedures    Place in Observation     Standing Status:   Standing     Number of Occurrences:   1     Order Specific Question:   Level of Care     Answer:   Med Surg [16]    Inpatient Admission     Standing Status:   Standing     Number of Occurrences:   1     Order Specific Question:   Level of Care     Answer:   Med Surg [16]     Order Specific Question:   Estimated length of stay     Answer:   More than 2 Midnights     Order Specific Question:   Certification     Answer:   I certify that inpatient services are medically necessary for this patient for a duration of greater than two midnights. See H&P and MD Progress Notes for additional information about the patient's course of treatment. ED Arrival Information       Expected   -    Arrival   9/10/2023 12:55    Acuity   Urgent              Means of arrival   Walk-In    Escorted by   Spouse    Service   Hospitalist    Admission type   Emergency              Arrival complaint   flank pain, headache             Chief Complaint   Patient presents with    Flank Pain     Left side radiating to left back. Denies N/V/D/ fevers. Started two days ago  Lightheaded     Urinary Frequency     Patient has been urinating more frequent. Patient reports he is a diabetic. Increased thirst and has not checked sugar     Headache     Left sided started today. Complained last week HA to back of the head       Initial Presentation: 56 y.o. male to the ED from home with complaints of flank pain, urinary frequency, headache.   Admitted under observation then converted to inpatient  for pancreatitis, DMII.  H/O HTN, DMII. Pain started about 2 days prior. CT a/p shows peripancreatic fat stranding consistent with pancreatitis. WBCs 15.00. GCS 15. Abdominal tenderness. BP elevated. In the ED, given IV dilaudid, IV fluid bolus, given IV toradol, iv morphine. Check LFTs, continue iv fluids. Clear liquid diet. Check RUQ US    Date:    9/11  Possibly alcohol related pancreatitis as he drank 3 polish beers which has higher alcohol content. LIpase WNL. CLear liquid diet. Continue with IV fluids. LUQ abdominal tenderness. Pain improved with IV toradol. 9/12 INPATIENT : Continue with IV fluids, still with mild abdominal pain. Monitor bp. Advance diet. 9/13 UPdate: Tolerating diet. Pain controled.      ED Triage Vitals   Temperature Pulse Respirations Blood Pressure SpO2   09/10/23 1334 09/10/23 1334 09/10/23 1334 09/10/23 1334 09/10/23 1334   97.6 °F (36.4 °C) 97 18 (!) 161/110 93 %      Temp Source Heart Rate Source Patient Position - Orthostatic VS BP Location FiO2 (%)   09/10/23 1334 09/10/23 1334 09/10/23 1334 09/10/23 1334 --   Temporal Monitor Sitting Right arm       Pain Score       09/10/23 1509       7          Wt Readings from Last 1 Encounters:   09/10/23 116 kg (255 lb 8.2 oz)     Additional Vital Signs:   ate/Time Temp Pulse Resp BP MAP (mmHg) SpO2 O2 Device Patient Position - Orthostatic VS   09/13/23 10:21:31 -- 65 -- 179/115 Abnormal  136 94 % -- --   09/13/23 07:41:42 97.6 °F (36.4 °C) 75 18 167/104 Abnormal  125 95 % None (Room air) Lying   09/12/23 23:36:27 -- 72 18 139/89 106 96 % -- Lying   09/12/23 22:15:24 98.8 °F (37.1 °C) 71 18 169/101 Abnormal  124 96 % None (Room air) Lying   09/12/23 20:27:46 -- 71 18 165/96 119 95 % -- Lying   09/12/23 1934 -- -- -- -- -- -- None (Room air) --   09/12/23 14:46:45 99 °F (37.2 °C) 70 18 180/99 Abnormal  126 90 % -- --   09/12/23 07:09:50 99.2 °F (37.3 °C) 73 18 160/99 119 96 % None (Room air) Lying   09/12/23 05:50:03 -- 72 -- 172/109 Abnormal 130 96 % -- Lying   09/11/23 2300 98.2 °F (36.8 °C) 63 18 165/98 120 93 % None (Room air)      e Temp Pulse Resp BP MAP (mmHg) SpO2 O2 Device Patient Position - Orthostatic VS   09/11/23 07:29:03 98.3 °F (36.8 °C) 82 18 171/98 Abnormal  122 96 % -- --   09/10/23 22:37:34 98.4 °F (36.9 °C) 83 20 175/104 Abnormal   128 95 % -- --   BP: losartan given at 09/10/23 2237   09/10/23 18:20:39 98.9 °F (37.2 °C) 92 18 176/105 Abnormal  129 96 % -- Sitting   09/10/23 1630 -- 80 18 139/67 97 95 % -- --   09/10/23 1628 -- -- -- 174/90 Abnormal  -- -- -- --   09/10/23 1625 -- 80 16 181/108 Abnormal  -- 95 % None (Room air) --   09/10/23 1430 -- 90 16 163/91 121 95 % -- --   09/10/23 1334 97.6 °F (36.4 °C) 97 18 161/110 Abnormal  -- 93 % None (Room air) Sitting     Pertinent Labs/Diagnostic Test Results:   9/10 EKG:   Normal sinus rhythm  Left axis deviation  When compared with ECG of 10-SEP-2023 14:28, (unconfirmed)  No significant change was found  CT head without contrast   Final Result by Loreta Chahal MD (09/10 1517)      No acute intracranial abnormality. Workstation performed: [id number]         CT abdomen pelvis with contrast   Final Result by Gricel Marinelli MD (09/10 1539)      No evidence of bowel obstruction. Mild peripancreatic fat stranding of the pancreatic tail suggestive of mild acute pancreatitis interstitial edematous pancreatitis. Hepatic steatosis and mild hepatosplenomegaly. Circumferential mural thickening of the distal esophagus, which may relate to esophagitis. The study was marked in [facility] for immediate notification. Workstation performed: [id number]         X-ray chest 1 view portable   ED Interpretation by RADHA Gould (09/10 1550)   No acute cardiopulmonary disease. Final Result by Citlalli Orozco MD (09/10 7646)      No acute cardiopulmonary disease.                Workstation performed: [id number]         US right upper quadrant    9/11 No evidence of biliary obstruction or discernible choledocholithiasis on this exam.     Mild acute pancreatitis seen on recent CT imaging not well depicted on ultrasound. No focal peripancreatic fluid collections detected. Enlarged fatty liver.           Results from last 7 days   Lab Units 09/13/23  0536 09/12/23  0545 09/11/23  0555 09/10/23  1341   WBC Thousand/uL 9.82 10.43* 12.27* 15.00*   HEMOGLOBIN g/dL 15.0 15.3 15.7 16.9   HEMATOCRIT % 45.4 46.1 48.1 50.4*   PLATELETS Thousands/uL 207 200 204 212   NEUTROS ABS Thousands/µL  --   --  9.52* 11.63*         Results from last 7 days   Lab Units 09/13/23  0536 09/12/23  0545 09/11/23  0555 09/10/23  1341   SODIUM mmol/L 135 133* 132* 130*   POTASSIUM mmol/L 3.4* 3.7 4.0 3.9   CHLORIDE mmol/L 103 101 99 97   CO2 mmol/L 22 21 21 22   ANION GAP mmol/L 10 11 12 11   BUN mg/dL 8 8 9 10   CREATININE mg/dL 0.71 0.64 0.73 0.83   EGFR ml/min/1.73sq m 105 110 104 99   CALCIUM mg/dL 8.6 8.7 8.4 9.3   MAGNESIUM mg/dL  --   --   --  1.9     Results from last 7 days   Lab Units 09/11/23  0555 09/10/23  1341   AST U/L 26 29   ALT U/L 50 64*   ALK PHOS U/L 90 97   TOTAL PROTEIN g/dL 6.7 7.5   ALBUMIN g/dL 3.6 4.1   TOTAL BILIRUBIN mg/dL 0.83 0.82   BILIRUBIN DIRECT mg/dL 0.19  --      Results from last 7 days   Lab Units 09/13/23  0740 09/12/23  2209 09/12/23  1603 09/12/23  1107 09/12/23  0709 09/11/23 2128 09/11/23  1557 09/11/23  1059 09/11/23  0657 09/10/23  2125 09/10/23  1730   POC GLUCOSE mg/dl 228* 263* 253* 274* 236* 271* 214* 283* 249* 287* 249*     Results from last 7 days   Lab Units 09/13/23  0536 09/12/23  0545 09/11/23  0555 09/10/23  1341   GLUCOSE RANDOM mg/dL 245* 244* 288* 328*         Results from last 7 days   Lab Units 09/10/23  1341   HEMOGLOBIN A1C % 13.6*   EAG mg/dl 344     Results from last 7 days   Lab Units 09/10/23  1840 09/10/23  1625 09/10/23  1431   HS TNI 0HR ng/L  --   --  12   HS TNI 2HR ng/L  --  13  --    HSTNI D2 ng/L  --  1  --    HS TNI 4HR ng/L 13  --   --    HSTNI D4 ng/L 1  --   --        Results from last 7 days   Lab Units 09/10/23  1341   LIPASE u/L 46       Results from last 7 days   Lab Units 09/10/23  1404   CLARITY UA  Clear   COLOR UA  Straw   SPEC GRAV UA  1.020   PH UA  5.5   GLUCOSE UA mg/dl 3+*   KETONES UA mg/dl 80 (3+)*   BLOOD UA  Trace-Intact*   PROTEIN UA mg/dl Trace*   NITRITE UA  Negative   BILIRUBIN UA  Negative   UROBILINOGEN UA E.U./dl 0.2   LEUKOCYTES UA  Negative   WBC UA /hpf 0-1   RBC UA /hpf 0-1   BACTERIA UA /hpf None Seen   EPITHELIAL CELLS WET PREP /hpf Occasional       ED Treatment:   Medication Administration from 09/10/2023 1255 to 09/10/2023 1814         Date/Time Order Dose Route Action Comments     09/10/2023 1432 EDT sodium chloride 0.9 % bolus 1,000 mL 1,000 mL Intravenous New Bag --     09/10/2023 1509 EDT acetaminophen (TYLENOL) tablet 650 mg 650 mg Oral Given --     09/10/2023 1558 EDT ketorolac (TORADOL) injection 15 mg 15 mg Intravenous Given --     09/10/2023 1650 EDT morphine injection 4 mg 4 mg Intravenous Given --          Past Medical History:   Diagnosis Date    Diabetes mellitus ([address])     Hyperlipidemia     Hypertension      Present on Admission:   Pancreatitis   Diabetes mellitus, type 2 (HCC)   HTN (hypertension)   Hyperlipidemia      Admitting Diagnosis: Pancreatitis [K85.90]  Abdominal pain [R10.9]  Hyperglycemia [R73.9]  Flank pain [R10.9]  Headache [R51.9]  Age/Sex: 56 y.o. male  Admission Orders:  Scheduled Medications:  atenolol, 50 mg, Oral, Daily  atorvastatin, 10 mg, Oral, Daily  enoxaparin, 40 mg, Subcutaneous, Q24H JEAN-CLAUDE  hydrochlorothiazide, 12.5 mg, Oral, Daily  insulin glargine, 10 Units, Subcutaneous, Q12H JEAN-CLAUDE  insulin lispro, 2-12 Units, Subcutaneous, TID AC  insulin lispro, 2-12 Units, Subcutaneous, HS  losartan, 100 mg, Oral, Daily  nicotine, 1 patch, Transdermal, Daily      Continuous IV Infusions:       PRN Meds:  acetaminophen, 650 mg, Oral, Q4H PRN  HYDROmorphone, 0.5 mg, Intravenous, Q6H PRN  ketorolac, 15 mg, Intravenous, Q6H PRN  labetalol, 10 mg, Intravenous, Q6H PRN x 2 9/12, x 1 9/13  ondansetron, 4 mg, Intravenous, Q6H PRN        IP CONSULT TO CASE MANAGEMENT    Network Utilization Review Department  ATTENTION: Please call with any questions or concerns to [phone] and carefully listen to the prompts so that you are directed to the right person. All voicemails are confidential.  Beti Tate all requests for admission clinical reviews, approved or denied determinations and any other requests to dedicated fax number below belonging to the campus where the patient is receiving treatment.  List of dedicated fax numbers for the Facilities:  [place] DENIALS (Administrative/Medical Necessity) [phone] [address] [facility] (Maternity/NICU/Pediatrics) [phone]   [address] [place] [address] Drive [phone]   [facility] [address] [phone]   [address] [place] [address] [phone]   [address] [facility] [address] [address] [address] [facility] [phone]

## 2023-09-11 NOTE — CASE MANAGEMENT
Case Management Assessment & Discharge Planning Note    Patient name Stevenson Alex  Location [address] [facility] [place] 221/-30 MRN [id number]  : 1965 Date 2023       Current Admission Date: 9/10/2023  Current Admission Diagnosis:Pancreatitis   Patient Active Problem List    Diagnosis Date Noted   • Hyperlipidemia 2023   • Pancreatitis 09/10/2023   • HTN (hypertension) 02/10/2023   • Diabetes mellitus, type 2 ([address]) 02/10/2023      LOS (days): 0  Geometric Mean LOS (GMLOS) (days):   Days to GMLOS:     OBJECTIVE:              Current admission status: Observation  Referral Reason: Other (Discharge planning)    Preferred Pharmacy:   [address] [place] [address] [id number] [place] [address]  Phone: [phone] Fax: [phone]    Primary Care Provider: Raul Ann MD    Primary Insurance: [employer]  Secondary Insurance: [employer]ET[employer]    ASSESSMENT:  [place] Proxies    There are no active Health Care Proxies on file. Readmission Root Cause  30 Day Readmission: No    Patient Information  Admitted from[de-identified] Home  Mental Status: Alert  During Assessment patient was accompanied by: Spouse  Assessment information provided by[de-identified] Patient, Spouse  Primary Caregiver: Self  Support Systems: Spouse/significant other  [place] of Residence: [facility] do you live in?: [address] [facility] entry access options.  Select all that apply.: Stairs  Type of Current Residence: 2 story home  In the last 12 months, was there a time when you were not able to pay the mortgage or rent on time?: No  In the last 12 months, how many places have you lived?: 1  In the last 12 months, was there a time when you did not have a steady place to sleep or slept in a shelter (including now)?: No  Homeless/housing insecurity resource given?: N/A  Living Arrangements: Lives w/ Spouse/significant other  Is patient a ?: No    Activities of Daily Living Prior to Admission  Functional Status: Independent  Completes ADLs independently?: Yes  Ambulates independently?: Yes  Does patient use assisted devices?: No  Does patient currently own DME?: No  Does patient have a history of Outpatient Therapy (PT/OT)?: No  Does the patient have a history of Short-Term Rehab?: No  Does patient have a history of HHC?: No  Does patient currently have [facility]?: No         Patient Information Continued  Income Source: Employed  Does patient have prescription coverage?: Yes  Within the past 12 months, you worried that your food would run out before you got the money to buy more.: Never true  Within the past 12 months, the food you bought just didn't last and you didn't have money to get more.: Never true  Food insecurity resource given?: N/A  Does patient have a history of substance abuse?: No  Does patient have a history of Mental Health Diagnosis?: No         Means of Transportation  Means of Transport to Appts[de-identified] Drives Self  In the past 12 months, has lack of transportation kept you from medical appointments or from getting medications?: No  In the past 12 months, has lack of transportation kept you from meetings, work, or from getting things needed for daily living?: No  Was application for public transport provided?: N/A        DISCHARGE DETAILS:    Discharge planning discussed with[de-identified] patient, spouse  Freedom of Choice: Yes     CM contacted family/caregiver?: Yes  Were Treatment Team discharge recommendations reviewed with patient/caregiver?: Yes  Did patient/caregiver verbalize understanding of patient care needs?: Yes  Were patient/caregiver advised of the risks associated with not following Treatment Team discharge recommendations?: Yes    Contacts  Patient Contacts: Andrzej Brown - spouse  Relationship to Patient[de-identified] Family  Contact Method:  In Person  Reason/Outcome: Discharge [address] [facility] [address] [facility] [address]         Is the patient interested in [facility] at discharge?: No    DME Referral Provided  Referral made for DME?: No         Would you like to participate in our [address] [facility] [address] service program?  : No - Declined    Treatment Team Recommendation: Home  Discharge Destination Plan[de-identified] Home  Transport at Discharge : Family         Additional Comments: Pt completely independent with ADLs prior to admit. Pt will return home with spouse on discharge. Pt has no identified CM discharge needs at this time. CM to follow.

## 2023-09-11 NOTE — PLAN OF CARE
Problem: PAIN - ADULT  Goal: Verbalizes/displays adequate comfort level or baseline comfort level  Description: Interventions:  - Encourage patient to monitor pain and request assistance  - Assess pain using appropriate pain scale  - Administer analgesics based on type and severity of pain and evaluate response  - Implement non-pharmacological measures as appropriate and evaluate response  - Consider cultural and social influences on pain and pain management  - Notify physician/advanced practitioner if interventions unsuccessful or patient reports new pain  Outcome: Progressing     Problem: INFECTION - ADULT  Goal: Absence or prevention of progression during hospitalization  Description: INTERVENTIONS:  - Assess and monitor for signs and symptoms of infection  - Monitor lab/diagnostic results  - Monitor all insertion sites, i.e. indwelling lines, tubes, and drains  - Monitor endotracheal if appropriate and nasal secretions for changes in amount and color  - [place] appropriate cooling/warming therapies per order  - Administer medications as ordered  - Instruct and encourage patient and family to use good hand hygiene technique  - Identify and instruct in appropriate isolation precautions for identified infection/condition  Outcome: Progressing     Problem: INFECTION - ADULT  Goal: Absence of fever/infection during neutropenic period  Description: INTERVENTIONS:  - Monitor WBC    Outcome: Progressing     Problem: GASTROINTESTINAL - ADULT  Goal: Minimal or absence of nausea and/or vomiting  Description: INTERVENTIONS:  - Administer IV fluids if ordered to ensure adequate hydration  - Maintain NPO status until nausea and vomiting are resolved  - Nasogastric tube if ordered  - Administer ordered antiemetic medications as needed  - Provide nonpharmacologic comfort measures as appropriate  - Advance diet as tolerated, if ordered  - Consider nutrition services referral to assist patient with adequate nutrition and appropriate food choices  Outcome: Progressing     Problem: GASTROINTESTINAL - ADULT  Goal: Maintains or returns to baseline bowel function  Description: INTERVENTIONS:  - Assess bowel function  - Encourage oral fluids to ensure adequate hydration  - Administer IV fluids if ordered to ensure adequate hydration  - Administer ordered medications as needed  - Encourage mobilization and activity  - Consider nutritional services referral to assist patient with adequate nutrition and appropriate food choices  Outcome: Progressing     Problem: GASTROINTESTINAL - ADULT  Goal: Oral mucous membranes remain intact  Description: INTERVENTIONS  - Assess oral mucosa and hygiene practices  - Implement preventative oral hygiene regimen  - Implement oral medicated treatments as ordered  - Initiate Nutrition services referral as needed  Outcome: Progressing

## 2023-09-11 NOTE — PLAN OF CARE
Problem: PAIN - ADULT  Goal: Verbalizes/displays adequate comfort level or baseline comfort level  Description: Interventions:  - Encourage patient to monitor pain and request assistance  - Assess pain using appropriate pain scale  - Administer analgesics based on type and severity of pain and evaluate response  - Implement non-pharmacological measures as appropriate and evaluate response  - Consider cultural and social influences on pain and pain management  - Notify physician/advanced practitioner if interventions unsuccessful or patient reports new pain  Outcome: Progressing     Problem: INFECTION - ADULT  Goal: Absence of fever/infection during neutropenic period  Description: INTERVENTIONS:  - Monitor WBC    Outcome: Progressing     Problem: SAFETY ADULT  Goal: Patient will remain free of falls  Description: INTERVENTIONS:  - Educate patient/family on patient safety including physical limitations  - Instruct patient to call for assistance with activity   - Consult OT/PT to assist with strengthening/mobility   - Keep Call bell within reach  - Keep bed low and locked with side rails adjusted as appropriate  - Keep care items and personal belongings within reach  - Initiate and maintain comfort rounds  - Make Fall Risk Sign visible to staff  - Offer Toileting every  2 Hours, in advance of need  - Initiate/Maintain bed alarm  - Obtain necessary fall risk management equipment: socks  - Apply yellow socks and bracelet for high fall risk patients  - Consider moving patient to room near nurses station  Outcome: Progressing

## 2023-09-12 LAB
ANION GAP SERPL CALCULATED.3IONS-SCNC: 11 MMOL/L
BUN SERPL-MCNC: 8 MG/DL (ref 5–25)
CALCIUM SERPL-MCNC: 8.7 MG/DL (ref 8.4–10.2)
CHLORIDE SERPL-SCNC: 101 MMOL/L (ref 96–108)
CO2 SERPL-SCNC: 21 MMOL/L (ref 21–32)
CREAT SERPL-MCNC: 0.64 MG/DL (ref 0.6–1.3)
ERYTHROCYTE [DISTWIDTH] IN BLOOD BY AUTOMATED COUNT: 11.9 % (ref 11.6–15.1)
GFR SERPL CREATININE-BSD FRML MDRD: 110 ML/MIN/1.73SQ M
GLUCOSE SERPL-MCNC: 236 MG/DL (ref 65–140)
GLUCOSE SERPL-MCNC: 244 MG/DL (ref 65–140)
GLUCOSE SERPL-MCNC: 253 MG/DL (ref 65–140)
GLUCOSE SERPL-MCNC: 263 MG/DL (ref 65–140)
GLUCOSE SERPL-MCNC: 274 MG/DL (ref 65–140)
HCT VFR BLD AUTO: 46.1 % (ref 36.5–49.3)
HGB BLD-MCNC: 15.3 G/DL (ref 12–17)
MCH RBC QN AUTO: 30.2 PG (ref 26.8–34.3)
MCHC RBC AUTO-ENTMCNC: 33.2 G/DL (ref 31.4–37.4)
MCV RBC AUTO: 91 FL (ref 82–98)
PLATELET # BLD AUTO: 200 THOUSANDS/UL (ref 149–390)
PMV BLD AUTO: 12 FL (ref 8.9–12.7)
POTASSIUM SERPL-SCNC: 3.7 MMOL/L (ref 3.5–5.3)
RBC # BLD AUTO: 5.06 MILLION/UL (ref 3.88–5.62)
SODIUM SERPL-SCNC: 133 MMOL/L (ref 135–147)
WBC # BLD AUTO: 10.43 THOUSAND/UL (ref 4.31–10.16)

## 2023-09-12 PROCEDURE — 85027 COMPLETE CBC AUTOMATED: CPT | Performed by: PHYSICIAN ASSISTANT

## 2023-09-12 PROCEDURE — 82948 REAGENT STRIP/BLOOD GLUCOSE: CPT

## 2023-09-12 PROCEDURE — 99232 SBSQ HOSP IP/OBS MODERATE 35: CPT | Performed by: PHYSICIAN ASSISTANT

## 2023-09-12 PROCEDURE — 80048 BASIC METABOLIC PNL TOTAL CA: CPT | Performed by: PHYSICIAN ASSISTANT

## 2023-09-12 RX ORDER — ATENOLOL 50 MG/1
50 TABLET ORAL DAILY
Status: DISCONTINUED | OUTPATIENT
Start: 2023-09-12 | End: 2023-09-13 | Stop reason: HOSPADM

## 2023-09-12 RX ORDER — INSULIN GLARGINE 100 [IU]/ML
10 INJECTION, SOLUTION SUBCUTANEOUS EVERY MORNING
Status: DISCONTINUED | OUTPATIENT
Start: 2023-09-12 | End: 2023-09-12

## 2023-09-12 RX ORDER — INSULIN GLARGINE 100 [IU]/ML
10 INJECTION, SOLUTION SUBCUTANEOUS EVERY 12 HOURS SCHEDULED
Status: DISCONTINUED | OUTPATIENT
Start: 2023-09-12 | End: 2023-09-13 | Stop reason: HOSPADM

## 2023-09-12 RX ADMIN — INSULIN LISPRO 6 UNITS: 100 INJECTION, SOLUTION INTRAVENOUS; SUBCUTANEOUS at 22:12

## 2023-09-12 RX ADMIN — INSULIN GLARGINE 10 UNITS: 100 INJECTION, SOLUTION SUBCUTANEOUS at 08:33

## 2023-09-12 RX ADMIN — Medication 10 MG: at 22:20

## 2023-09-12 RX ADMIN — INSULIN LISPRO 6 UNITS: 100 INJECTION, SOLUTION INTRAVENOUS; SUBCUTANEOUS at 17:24

## 2023-09-12 RX ADMIN — ATENOLOL 50 MG: 50 TABLET ORAL at 08:32

## 2023-09-12 RX ADMIN — ENOXAPARIN SODIUM 40 MG: 40 INJECTION SUBCUTANEOUS at 08:33

## 2023-09-12 RX ADMIN — SODIUM CHLORIDE 150 ML/HR: 0.9 INJECTION, SOLUTION INTRAVENOUS at 05:43

## 2023-09-12 RX ADMIN — SODIUM CHLORIDE 150 ML/HR: 0.9 INJECTION, SOLUTION INTRAVENOUS at 12:58

## 2023-09-12 RX ADMIN — SODIUM CHLORIDE 150 ML/HR: 0.9 INJECTION, SOLUTION INTRAVENOUS at 19:41

## 2023-09-12 RX ADMIN — INSULIN LISPRO 6 UNITS: 100 INJECTION, SOLUTION INTRAVENOUS; SUBCUTANEOUS at 12:59

## 2023-09-12 RX ADMIN — Medication 10 MG: at 05:55

## 2023-09-12 RX ADMIN — INSULIN GLARGINE 10 UNITS: 100 INJECTION, SOLUTION SUBCUTANEOUS at 22:12

## 2023-09-12 RX ADMIN — ACETAMINOPHEN 650 MG: 325 TABLET ORAL at 16:09

## 2023-09-12 RX ADMIN — HYDROCHLOROTHIAZIDE 12.5 MG: 12.5 TABLET ORAL at 08:32

## 2023-09-12 RX ADMIN — ATORVASTATIN CALCIUM 10 MG: 10 TABLET, FILM COATED ORAL at 08:32

## 2023-09-12 RX ADMIN — INSULIN LISPRO 4 UNITS: 100 INJECTION, SOLUTION INTRAVENOUS; SUBCUTANEOUS at 08:33

## 2023-09-12 RX ADMIN — LOSARTAN POTASSIUM 100 MG: 50 TABLET, FILM COATED ORAL at 08:32

## 2023-09-12 NOTE — ASSESSMENT & PLAN NOTE
· Lipase within normal limits however evidence of peripancreatic fat stranding on CT abdomen  · Possibly alcohol related- patient reports drinking 3 polish beers which is a higher alcohol content  · CLAUDIA US- negative for choledocholithiasis  · Triglycerides 273  · Hepatic function panel- WNL  · No evidence of pancreatic necrosis  · Patient with significant pain resistant to IV opioid medications in the ED  · Continue IV fluid hydration  · Pain regimen as ordered  · Diet advance- patient still with mild pain and does not feel ready to go.

## 2023-09-12 NOTE — PROGRESS NOTES
[address]  Progress Note  Name: Stephan Franklin  MRN: [id number]  Unit/Bed#: -01 I Date of Admission: 9/10/2023   Date of Service: 9/12/2023 I Hospital Day: 0    Assessment/Plan   * Pancreatitis  Assessment & Plan  · Lipase within normal limits however evidence of peripancreatic fat stranding on CT abdomen  · Possibly alcohol related- patient reports drinking 3 polish beers which is a higher alcohol content  · CLAUDIA US- negative for choledocholithiasis  · Triglycerides 273  · Hepatic function panel- WNL  · No evidence of pancreatic necrosis  · Patient with significant pain resistant to IV opioid medications in the ED  · Continue IV fluid hydration  · Pain regimen as ordered  · Diet advance- patient still with mild pain and does not feel ready to go. Hyperlipidemia  Assessment & Plan  · Continue Lipitor     Diabetes mellitus, type 2 ([address])  Assessment & Plan  · Patient is hyperglycemic on presentation  · Hold home oral hypoglycemic agents  · Sliding scale insulin with Accu-Cheks while inpatient  · Diabetic diet  · Added Lantus 10 U q 12 hours  · hemoglobin A1c- 13.6      HTN (hypertension)  Assessment & Plan  · BP continues to be elevated-   · Continue home HCTZ, losartan. Will increase atenolol 25 mg daily to 50 mg daily  · Monitor blood pressures           VTE Pharmacologic Prophylaxis: VTE Score: 2 Low Risk (Score 0-2) - Encourage Ambulation. Patient Centered Rounds: I performed bedside rounds with nursing staff today. Discussions with Specialists or Other Care Team Provider: nursing, CM    Education and Discussions with Family / Patient: Patient declined call to . Total Time Spent on Date of Encounter in care of patient: 25 mins.  This time was spent on one or more of the following: performing physical exam; counseling and coordination of care; obtaining or reviewing history; documenting in the medical record; reviewing/ordering tests, medications or procedures; communicating with other healthcare professionals and discussing with patient's family/caregivers. Current Length of Stay: 0 day(s)  Current Patient Status: Inpatient   Certification Statement: The patient, admitted on an observation basis, will now require > 2 midnight hospital stay due to continued pain, need for IVF, monitoring BP  Discharge Plan: Anticipate discharge tomorrow to home. Code Status: Level 1 - Full Code    Subjective: The patient was seen and examined. The patient states he continues to have mild pain and does not feel he's ready to go home. His BP continues to be elevated. Objective:     Vitals:   Temp (24hrs), Av.8 °F (37.1 °C), Min:98.2 °F (36.8 °C), Max:99.2 °F (37.3 °C)    Temp:  [98.2 °F (36.8 °C)-99.2 °F (37.3 °C)] 99 °F (37.2 °C)  HR:  [63-73] 70  Resp:  [18] 18  BP: (160-180)/() 180/99  SpO2:  [90 %-96 %] 90 %  Body mass index is 37.73 kg/m². Input and Output Summary (last 24 hours): Intake/Output Summary (Last 24 hours) at 2023 1750  Last data filed at 2023 1231  Gross per 24 hour   Intake 360 ml   Output 400 ml   Net -40 ml       Physical Exam:   Physical Exam  Vitals and nursing note reviewed. Constitutional:       General: He is awake. Appearance: Normal appearance. He is morbidly obese. HENT:      Head: Normocephalic and atraumatic. Cardiovascular:      Rate and Rhythm: Normal rate and regular rhythm. Heart sounds: Normal heart sounds. Pulmonary:      Effort: Pulmonary effort is normal.      Breath sounds: Normal breath sounds. Abdominal:      Palpations: Abdomen is soft. Tenderness: There is abdominal tenderness in the left upper quadrant. There is no guarding or rebound. Skin:     General: Skin is warm and dry. Neurological:      General: No focal deficit present. Mental Status: He is alert and oriented to person, place, and time.    Psychiatric:         Attention and Perception: Attention normal. Mood and Affect: Mood normal.         Speech: Speech normal.         Behavior: Behavior is cooperative. Additional Data:     Labs:  Results from last 7 days   Lab Units 09/12/23  0545 09/11/23  0555   WBC Thousand/uL 10.43* 12.27*   HEMOGLOBIN g/dL 15.3 15.7   HEMATOCRIT % 46.1 48.1   PLATELETS Thousands/uL 200 204   NEUTROS PCT %  --  79*   LYMPHS PCT %  --  13*   MONOS PCT %  --  7   EOS PCT %  --  1     Results from last 7 days   Lab Units 09/12/23  0545 09/11/23  0555   SODIUM mmol/L 133* 132*   POTASSIUM mmol/L 3.7 4.0   CHLORIDE mmol/L 101 99   CO2 mmol/L 21 21   BUN mg/dL 8 9   CREATININE mg/dL 0.64 0.73   ANION GAP mmol/L 11 12   CALCIUM mg/dL 8.7 8.4   ALBUMIN g/dL  --  3.6   TOTAL BILIRUBIN mg/dL  --  0.83   ALK PHOS U/L  --  90   ALT U/L  --  50   AST U/L  --  26   GLUCOSE RANDOM mg/dL 244* 288*         Results from last 7 days   Lab Units 09/12/23  1603 09/12/23  1107 09/12/23  0709 09/11/23  2128 09/11/23  1557 09/11/23  1059 09/11/23  0657 09/10/23  2125 09/10/23  1730   POC GLUCOSE mg/dl 253* 274* 236* 271* 214* 283* 249* 287* 249*     Results from last 7 days   Lab Units 09/10/23  1341   HEMOGLOBIN A1C % 13.6*           Lines/Drains:  Invasive Devices     Peripheral Intravenous Line  Duration           Peripheral IV 09/10/23 Left Antecubital 2 days                      Imaging: No pertinent imaging reviewed.     Recent Cultures (last 7 days):         Last 24 Hours Medication List:   Current Facility-Administered Medications   Medication Dose Route Frequency Provider Last Rate   • acetaminophen  650 mg Oral Q4H PRN Joan Martines DO     • atenolol  50 mg Oral Daily Dali Martin PA-C     • atorvastatin  10 mg Oral Daily Joan Martines DO     • enoxaparin  40 mg Subcutaneous Q24H [address] Joan Martines DO     • hydrochlorothiazide  12.5 mg Oral Daily Joan Boyers, DO     • HYDROmorphone  0.5 mg Intravenous Q6H PRN Joan Martines, DO     • insulin glargine  10 Units Subcutaneous Q12H [address] Nathalia Scruggs PA-C     • insulin lispro  2-12 Units Subcutaneous TID AC Elester Nims, DO     • insulin lispro  2-12 Units Subcutaneous HS Elester Nims, DO     • ketorolac  15 mg Intravenous Q6H PRN Nathalia Scruggs PA-C     • labetalol  10 mg Intravenous Q6H PRN Nathalia Scruggs PA-C     • losartan  100 mg Oral Daily Meghan Jenkins PA-C     • nicotine  1 patch Transdermal Daily Elester Nims, DO     • ondansetron  4 mg Intravenous Q6H PRN Elester Nims, DO     • sodium chloride  150 mL/hr Intravenous Continuous Nathalia Scruggs PA-C 150 mL/hr (09/12/23 1258)        Today, Patient Was Seen By: Nathalia Scruggs PA-C    **Please Note: This note may have been constructed using a voice recognition system. **

## 2023-09-12 NOTE — PLAN OF CARE
Problem: PAIN - ADULT  Goal: Verbalizes/displays adequate comfort level or baseline comfort level  Description: Interventions:  - Encourage patient to monitor pain and request assistance  - Assess pain using appropriate pain scale  - Administer analgesics based on type and severity of pain and evaluate response  - Implement non-pharmacological measures as appropriate and evaluate response  - Consider cultural and social influences on pain and pain management  - Notify physician/advanced practitioner if interventions unsuccessful or patient reports new pain  Outcome: Not Progressing     Problem: INFECTION - ADULT  Goal: Absence or prevention of progression during hospitalization  Description: INTERVENTIONS:  - Assess and monitor for signs and symptoms of infection  - Monitor lab/diagnostic results  - Monitor all insertion sites, i.e. indwelling lines, tubes, and drains  - Monitor endotracheal if appropriate and nasal secretions for changes in amount and color  - [place] appropriate cooling/warming therapies per order  - Administer medications as ordered  - Instruct and encourage patient and family to use good hand hygiene technique  - Identify and instruct in appropriate isolation precautions for identified infection/condition  Outcome: Not Progressing

## 2023-09-12 NOTE — ASSESSMENT & PLAN NOTE
· BP continues to be elevated-   · Continue home HCTZ, losartan.  Will increase atenolol 25 mg daily to 50 mg daily  · Monitor blood pressures

## 2023-09-12 NOTE — ASSESSMENT & PLAN NOTE
· Patient is hyperglycemic on presentation  · Hold home oral hypoglycemic agents  · Sliding scale insulin with Accu-Cheks while inpatient  · Diabetic diet  · Added Lantus 10 U q 12 hours  · hemoglobin A1c- 13.6

## 2023-09-12 NOTE — NUTRITION
09/12/23 1506   Biochemical Data,Medical Tests, and Procedures   Biochemical Data/Medical Tests/Procedures Lab values reviewed; Meds reviewed   Labs (Comment) 9/12/23 glucose , Na 133   Meds (Comment) atorvastatin, insulin, NaCl infusion   Nutrition-Focused Physical Exam   Nutrition-Focused Physical Exam Findings RN skin assessment reviewed; No edema documented; No skin issues documented   Medical-Related Concerns HTN, type 2 diabetes, HLD   Current PO Intake   Current Diet Order CCD2, thin liquids   Current Meal Intake Suboptimal, but improving   Estimated calorie intake compared to estimated need As diet advances, nutrient needs are likely met. PES Statement   Biochemical (2) Altered nutrition-related laboratory values (specify) NC-2.2  (HgA1c)   Related to Other (Comment)  (type 2 diabetes)   As evidenced by: Abnormal lab (Specify)  (HgA1c 13. 6)   Recommendations/Interventions   Malnutrition/BMI Present No   Summary HgA1c 13. 6. Presents with left-sided flank pain, urinary frequency, headache. Found to have pancreatitis. Past medical history significant for HTN, type 2 diabetes, HLD. Weight history reviewed. No significant changes. No edema. No pressure areas. Was prescribed a Diabetic Clear Liquids diet. Diet advanced to CCD2, thin liquids this afternoon. Met with pt at bedside. He reports having a good appetite. Usually has 3 meals/day. He reports occupation as , diet is impacted by hours on the read. Reports often eating sandwiches and other ready to eat foods. NKFA. Denies dysphagia. Pt and his wife cook and grocery shop. Reports weight is stable at 260#. He reports not checking his blood sugar often. Reports taking PO diabetes medications however had run out PTA. Pt reports receiving diabetes nutrition education in the past. Discussed diet low in fat if pancreatitis is confirmed. Pt verbalizes understanding. He offers no nutrition questions at this time.  Diet as prescribed is appropriate. RD to follow. Interventions/Recommendations Continue current diet order;Monitor I & O's   Education Assessment   Education Education not indicated at this time  (Discussed diet low in fat if pancreatitis is confirmed. Pt verbalizes understanding.)   Patient Nutrition Goals   Goal Glucose WNL; Adequate intake

## 2023-09-13 VITALS
SYSTOLIC BLOOD PRESSURE: 147 MMHG | WEIGHT: 255.51 LBS | TEMPERATURE: 97.6 F | HEIGHT: 69 IN | RESPIRATION RATE: 16 BRPM | HEART RATE: 75 BPM | DIASTOLIC BLOOD PRESSURE: 82 MMHG | BODY MASS INDEX: 37.84 KG/M2 | OXYGEN SATURATION: 96 %

## 2023-09-13 LAB
ANION GAP SERPL CALCULATED.3IONS-SCNC: 10 MMOL/L
BUN SERPL-MCNC: 8 MG/DL (ref 5–25)
CALCIUM SERPL-MCNC: 8.6 MG/DL (ref 8.4–10.2)
CHLORIDE SERPL-SCNC: 103 MMOL/L (ref 96–108)
CO2 SERPL-SCNC: 22 MMOL/L (ref 21–32)
CREAT SERPL-MCNC: 0.71 MG/DL (ref 0.6–1.3)
ERYTHROCYTE [DISTWIDTH] IN BLOOD BY AUTOMATED COUNT: 11.9 % (ref 11.6–15.1)
GFR SERPL CREATININE-BSD FRML MDRD: 105 ML/MIN/1.73SQ M
GLUCOSE SERPL-MCNC: 228 MG/DL (ref 65–140)
GLUCOSE SERPL-MCNC: 245 MG/DL (ref 65–140)
GLUCOSE SERPL-MCNC: 259 MG/DL (ref 65–140)
HCT VFR BLD AUTO: 45.4 % (ref 36.5–49.3)
HGB BLD-MCNC: 15 G/DL (ref 12–17)
MCH RBC QN AUTO: 30.4 PG (ref 26.8–34.3)
MCHC RBC AUTO-ENTMCNC: 33 G/DL (ref 31.4–37.4)
MCV RBC AUTO: 92 FL (ref 82–98)
PLATELET # BLD AUTO: 207 THOUSANDS/UL (ref 149–390)
PMV BLD AUTO: 11.6 FL (ref 8.9–12.7)
POTASSIUM SERPL-SCNC: 3.4 MMOL/L (ref 3.5–5.3)
RBC # BLD AUTO: 4.93 MILLION/UL (ref 3.88–5.62)
SODIUM SERPL-SCNC: 135 MMOL/L (ref 135–147)
WBC # BLD AUTO: 9.82 THOUSAND/UL (ref 4.31–10.16)

## 2023-09-13 PROCEDURE — 85027 COMPLETE CBC AUTOMATED: CPT | Performed by: PHYSICIAN ASSISTANT

## 2023-09-13 PROCEDURE — 80048 BASIC METABOLIC PNL TOTAL CA: CPT | Performed by: PHYSICIAN ASSISTANT

## 2023-09-13 PROCEDURE — 82948 REAGENT STRIP/BLOOD GLUCOSE: CPT

## 2023-09-13 PROCEDURE — 99239 HOSP IP/OBS DSCHRG MGMT >30: CPT | Performed by: HOSPITALIST

## 2023-09-13 RX ORDER — POTASSIUM CHLORIDE 20 MEQ/1
40 TABLET, EXTENDED RELEASE ORAL ONCE
Status: COMPLETED | OUTPATIENT
Start: 2023-09-13 | End: 2023-09-13

## 2023-09-13 RX ORDER — ATENOLOL 50 MG/1
50 TABLET ORAL DAILY
Qty: 30 TABLET | Refills: 0 | Status: SHIPPED | OUTPATIENT
Start: 2023-09-14 | End: 2024-02-20

## 2023-09-13 RX ORDER — HYDRALAZINE HYDROCHLORIDE 20 MG/ML
20 INJECTION INTRAMUSCULAR; INTRAVENOUS ONCE
Status: COMPLETED | OUTPATIENT
Start: 2023-09-13 | End: 2023-09-13

## 2023-09-13 RX ADMIN — SODIUM CHLORIDE 150 ML/HR: 0.9 INJECTION, SOLUTION INTRAVENOUS at 01:35

## 2023-09-13 RX ADMIN — ATORVASTATIN CALCIUM 10 MG: 10 TABLET, FILM COATED ORAL at 08:12

## 2023-09-13 RX ADMIN — LOSARTAN POTASSIUM 100 MG: 50 TABLET, FILM COATED ORAL at 08:12

## 2023-09-13 RX ADMIN — HYDRALAZINE HYDROCHLORIDE 20 MG: 20 INJECTION INTRAMUSCULAR; INTRAVENOUS at 11:45

## 2023-09-13 RX ADMIN — ATENOLOL 50 MG: 50 TABLET ORAL at 08:12

## 2023-09-13 RX ADMIN — POTASSIUM CHLORIDE 40 MEQ: 1500 TABLET, EXTENDED RELEASE ORAL at 10:50

## 2023-09-13 RX ADMIN — ENOXAPARIN SODIUM 40 MG: 40 INJECTION SUBCUTANEOUS at 08:12

## 2023-09-13 RX ADMIN — INSULIN LISPRO 4 UNITS: 100 INJECTION, SOLUTION INTRAVENOUS; SUBCUTANEOUS at 08:12

## 2023-09-13 RX ADMIN — INSULIN LISPRO 6 UNITS: 100 INJECTION, SOLUTION INTRAVENOUS; SUBCUTANEOUS at 11:26

## 2023-09-13 RX ADMIN — INSULIN GLARGINE 10 UNITS: 100 INJECTION, SOLUTION SUBCUTANEOUS at 08:12

## 2023-09-13 RX ADMIN — SODIUM CHLORIDE 150 ML/HR: 0.9 INJECTION, SOLUTION INTRAVENOUS at 07:53

## 2023-09-13 RX ADMIN — Medication 10 MG: at 10:23

## 2023-09-13 RX ADMIN — HYDROCHLOROTHIAZIDE 12.5 MG: 12.5 TABLET ORAL at 08:12

## 2023-09-13 NOTE — ASSESSMENT & PLAN NOTE
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

## 2023-09-13 NOTE — ASSESSMENT & PLAN NOTE
· Volatility noted in blood pressure  · DC home on hydrochlorothiazide, losartan, and the higher dose of Tenormin  · Patient follow-up with PCP for further antihypertensive medication optimization

## 2023-09-13 NOTE — NURSING NOTE
Pt DC to home via spouse in stable condition. All belongings packed and sent with pt. AVS reviewed and sent.

## 2023-09-13 NOTE — UTILIZATION REVIEW
NOTIFICATION OF INPATIENT ADMISSION   AUTHORIZATION REQUEST   SERVICING FACILITY:   [address] [place] [address]  [id number] [place], [place] [address]phanie [address]  Tax ID: [id number]  NPI: [id number]   ATTENDING PROVIDER:  Attending Name and NPI#: Chlesie Arzolajeremychela [place] [[id number]]  Address: [address] [place] [address]  Phone: [phone]     ADMISSION INFORMATION:  Place of Service: [address] [place] [address]  Place of Service Code: 21  Inpatient Admission Date/Time: 9/12/23  5:50 PM  Discharge Date/Time: No discharge date for patient encounter. Admitting Diagnosis Code/Description:  Pancreatitis [K85.90]  Abdominal pain [R10.9]  Hyperglycemia [R73.9]  Flank pain [R10.9]  Headache [R51.9]     UTILIZATION REVIEW CONTACT:  Salvador [facility] Utilization   Network Utilization Review Department  Phone: [phone]  Fax [phone]  Email: [email or web address]  Contact for approvals/pending authorizations, clinical reviews, and discharge. PHYSICIAN ADVISORY SERVICES:  Medical Necessity Denial & [facility] Review  Phone: [phone]  Fax: [phone]  Email: [email or web address]

## 2023-09-13 NOTE — PLAN OF CARE
Problem: PAIN - ADULT  Goal: Verbalizes/displays adequate comfort level or baseline comfort level  Description: Interventions:  - Encourage patient to monitor pain and request assistance  - Assess pain using appropriate pain scale  - Administer analgesics based on type and severity of pain and evaluate response  - Implement non-pharmacological measures as appropriate and evaluate response  - Consider cultural and social influences on pain and pain management  - Notify physician/advanced practitioner if interventions unsuccessful or patient reports new pain  Outcome: Progressing     Problem: INFECTION - ADULT  Goal: Absence or prevention of progression during hospitalization  Description: INTERVENTIONS:  - Assess and monitor for signs and symptoms of infection  - Monitor lab/diagnostic results  - Monitor all insertion sites, i.e. indwelling lines, tubes, and drains  - Monitor endotracheal if appropriate and nasal secretions for changes in amount and color  - [place] appropriate cooling/warming therapies per order  - Administer medications as ordered  - Instruct and encourage patient and family to use good hand hygiene technique  - Identify and instruct in appropriate isolation precautions for identified infection/condition  Outcome: Progressing

## 2023-09-13 NOTE — PLAN OF CARE
Problem: PAIN - ADULT  Goal: Verbalizes/displays adequate comfort level or baseline comfort level  Description: Interventions:  - Encourage patient to monitor pain and request assistance  - Assess pain using appropriate pain scale  - Administer analgesics based on type and severity of pain and evaluate response  - Implement non-pharmacological measures as appropriate and evaluate response  - Consider cultural and social influences on pain and pain management  - Notify physician/advanced practitioner if interventions unsuccessful or patient reports new pain  Outcome: Progressing     Problem: INFECTION - ADULT  Goal: Absence or prevention of progression during hospitalization  Description: INTERVENTIONS:  - Assess and monitor for signs and symptoms of infection  - Monitor lab/diagnostic results  - Monitor all insertion sites, i.e. indwelling lines, tubes, and drains  - Monitor endotracheal if appropriate and nasal secretions for changes in amount and color  - [place] appropriate cooling/warming therapies per order  - Administer medications as ordered  - Instruct and encourage patient and family to use good hand hygiene technique  - Identify and instruct in appropriate isolation precautions for identified infection/condition  Outcome: Progressing

## 2023-09-13 NOTE — ASSESSMENT & PLAN NOTE
· DC home on preadmit meds at preadmit doses  · Patient prefers to follow-up with his PCP regarding outpatient insulin initiation

## 2023-09-13 NOTE — DISCHARGE SUMMARY
[address]  Discharge- Victorino Mcintyre 1965, 56 y.o. male MRN: [id number]  Unit/Bed#: -01 Encounter: [id number]  Primary Care Provider: Shantel Lomeli MD   Date and time admitted to hospital: 9/10/2023  1:44 PM    * Pancreatitis  Assessment & Plan  · Patient feels well today, and is eager on wanting to go home   · Was likely an alcohol induced pancreatitis  · CLAUDIA US- negative for choledocholithiasis  · Triglycerides 273  · Hepatic function panel- WNL  · No evidence of pancreatic necrosis  · Status post treatment with IV fluid, and IV pain meds  · Patient is tolerating a diet well  · DC home today  · Alcohol cessation counseling provided  · Outpatient follow-up with PCP    HTN (hypertension)  Assessment & Plan  · Volatility noted in blood pressure  · DC home on hydrochlorothiazide, losartan, and the higher dose of Tenormin  · Patient follow-up with PCP for further antihypertensive medication optimization    Diabetes mellitus, type 2 ([address])  Assessment & Plan  · DC home on preadmit meds at preadmit doses  · Patient prefers to follow-up with his PCP regarding outpatient insulin initiation      Hyperlipidemia  Assessment & Plan  · Continue Lipitor post discharge        Discharging Physician / Practitioner: Brian Reeves MD  PCP: Shantel Lomeli MD  Admission Date:   Admission Orders (From admission, onward)     Ordered        09/12/23 1750  Inpatient Admission  Once            09/10/23 1706  Place in Observation  Once                      Discharge Date: 09/13/23    Medical Problems     Resolved Problems  Date Reviewed: 9/12/2023   None         Consultations During Hospital Stay:  · None    Procedures Performed:   · None    Significant Findings / Test Results:   · Ultrasound right upper quadrant - No evidence of biliary obstruction or discernible choledocholithiasis on this exam. Mild acute pancreatitis seen on recent CT imaging not well depicted on ultrasound. No focal peripancreatic fluid collections detected. Enlarged fatty liver. · CT head-no acute intracranial abnormality  · CT abdomen pelvis-No evidence of bowel obstruction. Mild peripancreatic fat stranding of the pancreatic tail suggestive of mild acute pancreatitis interstitial edematous pancreatitis. Hepatic steatosis and mild hepatosplenomegaly. Circumferential mural thickening of the distal esophagus, which may relate to esophagitis. · X-ray chest-no acute cardiopulmonary disease    Incidental Findings:   · None    Test Results Pending at Discharge (will require follow up): · None     Outpatient Tests Requested:  · None    Complications:    • None    Reason for Admission: Cryptitis    Hospital Course:     Vandana Sultana is a 56 y.o. male patient who originally presented to the hospital on 9/10/2023 due to abdominal pain. Please refer to the initial history and physical examination completed by Dr. Sixto Michel for the initial presenting features and complaints. In brief, the patient is a 17-year-old male, who presented to the emergency room with a chief complaint of abdominal pain. He was diagnosed with a pancreatitis. Imaging studies performed on this admission are resulted as outlined above. After being admitted to [address] [facility], he was initially treated with supportive therapy-n.p.o., IV fluids, IV pain meds. His diet was advanced as tolerated. By September 13, 2023, patient looked and felt a lot better and was ready to go home. He was noted to have an A1c of greater than 10, discussion was had regarding insulin initiation postdischarge, patient preferred to follow-up with his PCP regarding that. He was discharged home on all of his other preadmission medications, at the preadmission doses. Please refer to the assessment/plan portion of this discharge summary as outlined above for additional details regarding his stay.     Please see above list of diagnoses and related plan for additional information. Condition at Discharge: good     Discharge Day Visit / Exam:     Subjective: Patient seen and examined, looks and feels okay, is ready to go home  Vitals: Blood Pressure: (!) 179/115 (09/13/23 1021)  Pulse: 65 (09/13/23 1021)  Temperature: 97.6 °F (36.4 °C) (09/13/23 0741)  Temp Source: Oral (09/13/23 0741)  Respirations: 18 (09/13/23 0741)  Height: 5' 9" (175.3 cm) (09/10/23 1817)  Weight - Scale: 116 kg (255 lb 8.2 oz) (09/10/23 1817)  SpO2: 94 % (09/13/23 1021)  Exam:   Physical Exam  Vitals and nursing note reviewed. Constitutional:       General: He is not in acute distress. Appearance: Normal appearance. He is not ill-appearing. HENT:      Head: Normocephalic and atraumatic. Nose: Nose normal.   Eyes:      Extraocular Movements: Extraocular movements intact. Pupils: Pupils are equal, round, and reactive to light. Cardiovascular:      Rate and Rhythm: Normal rate and regular rhythm. Pulses: Normal pulses. Heart sounds: Normal heart sounds. No murmur heard. No friction rub. No gallop. Pulmonary:      Effort: Pulmonary effort is normal.      Breath sounds: Normal breath sounds. Abdominal:      General: There is no distension. Palpations: Abdomen is soft. There is no mass. Tenderness: There is no abdominal tenderness. There is no guarding or rebound. Musculoskeletal:         General: No swelling or tenderness. Normal range of motion. Cervical back: Normal range of motion and neck supple. No rigidity. No muscular tenderness. Right lower leg: No edema. Left lower leg: No edema. Skin:     General: Skin is warm. Capillary Refill: Capillary refill takes less than 2 seconds. Findings: No erythema or rash. Neurological:      General: No focal deficit present. Mental Status: He is alert and oriented to person, place, and time. Mental status is at baseline.    Psychiatric:         Mood and Affect: Mood normal. Behavior: Behavior normal.         Discussion with Family: No family members were present at the time of my discharge evaluation, nor did the patient ask and want me to call anyone    Discharge instructions/Information to patient and family:   See after visit summary for information provided to patient and family. Provisions for Follow-Up Care:  See after visit summary for information related to follow-up care and any pertinent home health orders. Disposition:     Home      Planned Readmission:   • None     Discharge Statement:  I spent 35 minutes discharging the patient. This time was spent on the day of discharge. I had direct contact with the patient on the day of discharge. Greater than 50% of the total time was spent examining patient, answering all patient questions, arranging and discussing plan of care with patient as well as directly providing post-discharge instructions. Additional time then spent on discharge activities. Discharge Medications:  See after visit summary for reconciled discharge medications provided to patient and family.       ** Please Note: This note has been constructed using a voice recognition system **

## 2023-09-14 NOTE — UTILIZATION REVIEW
NOTIFICATION OF ADMISSION DISCHARGE   This is a Notification of Discharge from [address] Please be advised that this patient has been discharge from our facility. Below you will find the admission and discharge date and time including the patient’s disposition. UTILIZATION REVIEW CONTACT:  Dez Molina  Utilization   Network Utilization Review Department  Phone: [phone] carefully listen to the prompts. All voicemails are confidential.  Email: [email or web address]     ADMISSION INFORMATION  PRESENTATION DATE: 9/10/2023  1:44 PM  OBERVATION ADMISSION DATE:   INPATIENT ADMISSION DATE: 9/12/23  5:50 PM   DISCHARGE DATE: 9/13/2023  1:42 PM   DISPOSITION:Home/Self Care    IMPORTANT INFORMATION:  Send all requests for admission clinical reviews, approved or denied determinations and any other requests to dedicated fax number below belonging to the campus where the patient is receiving treatment.  List of dedicated fax numbers:  [place] DENIALS (Administrative/Medical Necessity) [phone] [address] [facility] (Maternity/NICU/Pediatrics) [phone]   [facility] [phone]   [facility] [phone] [address] [facility] [phone]   [address] [place] [address] [phone]   [facility] [phone]   [address] [place], [place] [address] [address] [facility] [phone]   [address] [place] [address] [phone] [address] [facility] [phone] [address] [facility] [address] [facility] [phone]

## 2024-01-12 ENCOUNTER — TELEPHONE (OUTPATIENT)
Age: 59
End: 2024-01-12

## 2024-01-12 NOTE — TELEPHONE ENCOUNTER
Scheduled date of colonoscopy (as of today):2/20/24  Physician performing colonoscopy:DR SO  Location of colonoscopy:[facility]  Bowel prep reviewed with patient:EJNNIFER/TAWANDA WILL  AT THE OFFICE  Instructions reviewed with patient by:KM  Clearances: N/A

## 2024-02-20 ENCOUNTER — HOSPITAL ENCOUNTER (OUTPATIENT)
Dept: GASTROENTEROLOGY | Facility: HOSPITAL | Age: 59
Setting detail: OUTPATIENT SURGERY
Discharge: HOME | End: 2024-02-20
Attending: INTERNAL MEDICINE | Admitting: INTERNAL MEDICINE
Payer: COMMERCIAL

## 2024-02-20 ENCOUNTER — ANESTHESIA EVENT (OUTPATIENT)
Dept: GASTROENTEROLOGY | Facility: HOSPITAL | Age: 59
End: 2024-02-20

## 2024-02-20 ENCOUNTER — ANESTHESIA (OUTPATIENT)
Dept: GASTROENTEROLOGY | Facility: HOSPITAL | Age: 59
End: 2024-02-20

## 2024-02-20 VITALS
TEMPERATURE: 98.7 F | HEART RATE: 60 BPM | BODY MASS INDEX: 39.54 KG/M2 | DIASTOLIC BLOOD PRESSURE: 75 MMHG | SYSTOLIC BLOOD PRESSURE: 119 MMHG | OXYGEN SATURATION: 96 % | WEIGHT: 266.98 LBS | HEIGHT: 69 IN | RESPIRATION RATE: 22 BRPM

## 2024-02-20 DIAGNOSIS — Z86.010 HISTORY OF COLON POLYPS: ICD-10-CM

## 2024-02-20 LAB — GLUCOSE SERPL-MCNC: 228 MG/DL (ref 65–140)

## 2024-02-20 PROCEDURE — 45385 COLONOSCOPY W/LESION REMOVAL: CPT | Performed by: INTERNAL MEDICINE

## 2024-02-20 PROCEDURE — 82948 REAGENT STRIP/BLOOD GLUCOSE: CPT

## 2024-02-20 PROCEDURE — 88305 TISSUE EXAM BY PATHOLOGIST: CPT | Performed by: STUDENT IN AN ORGANIZED HEALTH CARE EDUCATION/TRAINING PROGRAM

## 2024-02-20 RX ORDER — SODIUM CHLORIDE, SODIUM LACTATE, POTASSIUM CHLORIDE, CALCIUM CHLORIDE 600; 310; 30; 20 MG/100ML; MG/100ML; MG/100ML; MG/100ML
INJECTION, SOLUTION INTRAVENOUS CONTINUOUS PRN
Status: DISCONTINUED | OUTPATIENT
Start: 2024-02-20 | End: 2024-02-20

## 2024-02-20 RX ORDER — PROPOFOL 10 MG/ML
INJECTION, EMULSION INTRAVENOUS AS NEEDED
Status: DISCONTINUED | OUTPATIENT
Start: 2024-02-20 | End: 2024-02-20

## 2024-02-20 RX ADMIN — PROPOFOL 100 MG: 10 INJECTION, EMULSION INTRAVENOUS at 08:06

## 2024-02-20 RX ADMIN — SODIUM CHLORIDE, SODIUM LACTATE, POTASSIUM CHLORIDE, AND CALCIUM CHLORIDE: .6; .31; .03; .02 INJECTION, SOLUTION INTRAVENOUS at 07:59

## 2024-02-20 RX ADMIN — PROPOFOL 200 MG: 10 INJECTION, EMULSION INTRAVENOUS at 08:01

## 2024-02-20 NOTE — H&P
"History and Physical -  [facility]  July Nascimento 58 y.o. male MRN: [id number]      HPI: July Nascimento is a 58 y.o. year old male who presents for history of more than 10 polyps of the colon 1 year ago      REVIEW OF SYSTEMS: Per the HPI, and otherwise unremarkable.    Historical Information   Past Medical History:   Diagnosis Date    Colon polyp     Diabetes mellitus (HCC)     Hyperlipidemia     Hypertension      Past Surgical History:   Procedure Laterality Date    KNEE ARTHROSCOPY       Social History   Social History     Substance and Sexual Activity   Alcohol Use Yes    Comment: occas     Social History     Substance and Sexual Activity   Drug Use Never     Social History     Tobacco Use   Smoking Status Former    Current packs/day: 0.50    Types: Cigarettes   Smokeless Tobacco Never     Family History   Problem Relation Age of Onset    No Known Problems Mother     No Known Problems Father        Meds/Allergies     (Not in a hospital admission)      No Known Allergies    Objective     Blood pressure 130/80, pulse 64, temperature (!) 97.1 °F (36.2 °C), temperature source Temporal, resp. rate 18, height 5' 9\" (1.753 m), weight 121 kg (266 lb 15.6 oz), SpO2 100%.      PHYSICAL EXAM    Gen: NAD  CV: RRR  CHEST: Clear  ABD: soft, NT/ND  EXT: no edema      ASSESSMENT/PLAN:  This is a 58 y.o. year old male here for colonoscopy, and he is stable and optimized for his procedure.          "

## 2024-02-20 NOTE — ANESTHESIA PREPROCEDURE EVALUATION
Procedure:  COLONOSCOPY    Relevant Problems   CARDIO   (+) HTN (hypertension)   (+) Hyperlipidemia      ENDO   (+) Diabetes mellitus, type 2 (HCC)      GI/HEPATIC   (+) Pancreatitis        Physical Exam    Airway    Mallampati score: I  TM Distance: >3 FB  Neck ROM: full     Dental       Cardiovascular  Cardiovascular exam normal    Pulmonary  Pulmonary exam normal     Other Findings        Anesthesia Plan  ASA Score- 2     Anesthesia Type- IV sedation with anesthesia with ASA Monitors.         Additional Monitors:     Airway Plan:            Plan Factors-Exercise tolerance (METS): >4 METS.    Chart reviewed. EKG reviewed. Imaging results reviewed. Existing labs reviewed. Patient summary reviewed.                  Induction- intravenous.    Postoperative Plan- Plan for postoperative opioid use. Planned trial extubation    Informed Consent- Anesthetic plan and risks discussed with patient.  I personally reviewed this patient with the CRNA. Discussed and agreed on the Anesthesia Plan with the CRNA..

## 2024-02-20 NOTE — ANESTHESIA POSTPROCEDURE EVALUATION
Post-Op Assessment Note    CV Status:  Stable  Pain Score: 0    Pain management: adequate       Mental Status:  Sleepy   Hydration Status:  Stable   PONV Controlled:  Controlled   Airway Patency:  Patent     Post Op Vitals Reviewed: Yes    No anethesia notable event occurred.    Staff: Anesthesiologist, CRNA               BP      Temp      Pulse     Resp      SpO2

## 2024-02-22 PROCEDURE — 88305 TISSUE EXAM BY PATHOLOGIST: CPT | Performed by: STUDENT IN AN ORGANIZED HEALTH CARE EDUCATION/TRAINING PROGRAM

## 2024-02-23 ENCOUNTER — TELEPHONE (OUTPATIENT)
Dept: GASTROENTEROLOGY | Facility: CLINIC | Age: 59
End: 2024-02-23

## 2024-02-23 NOTE — TELEPHONE ENCOUNTER
----- Message from Florentin Upton DO sent at 2/23/2024  7:25 AM EST -----  Please call the patient and make him aware that he had 2 polyps in the colon one was a precancerous polyp in the transverse colon and the other was a hyperplastic or benign polyp in the rectum.  His next colonoscopy is due in 5 years

## 2024-02-23 NOTE — TELEPHONE ENCOUNTER
Called pt and relayed results as per Dr. Upton.    Pt voiced understanding.          Please call the patient and make him aware that he had 2 polyps in the colon one was a precancerous polyp in the transverse colon and the other was a hyperplastic or benign polyp in the rectum.  His next colonoscopy is due in 5 years